# Patient Record
Sex: FEMALE | Race: WHITE | Employment: FULL TIME | URBAN - METROPOLITAN AREA
[De-identification: names, ages, dates, MRNs, and addresses within clinical notes are randomized per-mention and may not be internally consistent; named-entity substitution may affect disease eponyms.]

---

## 2023-11-26 ENCOUNTER — HOSPITAL ENCOUNTER (INPATIENT)
Age: 23
LOS: 1 days | Discharge: HOME | DRG: 720 | End: 2023-11-28
Attending: EMERGENCY MEDICINE | Admitting: INTERNAL MEDICINE
Payer: COMMERCIAL

## 2023-11-26 ENCOUNTER — APPOINTMENT (OUTPATIENT)
Dept: ULTRASOUND IMAGING | Age: 23
DRG: 720 | End: 2023-11-26
Payer: COMMERCIAL

## 2023-11-26 DIAGNOSIS — O00.201 RIGHT OVARIAN PREGNANCY WITHOUT INTRAUTERINE PREGNANCY: ICD-10-CM

## 2023-11-26 DIAGNOSIS — A41.9 SEPTICEMIA (HCC): Primary | ICD-10-CM

## 2023-11-26 DIAGNOSIS — E87.6 HYPOKALEMIA: ICD-10-CM

## 2023-11-26 DIAGNOSIS — N12 PYELONEPHRITIS: ICD-10-CM

## 2023-11-26 LAB
ABO + RH BLD: NORMAL
ALBUMIN SERPL-MCNC: 4.6 G/DL (ref 3.4–5)
ALBUMIN/GLOB SERPL: 1.5 {RATIO} (ref 1.1–2.2)
ALP SERPL-CCNC: 116 U/L (ref 40–129)
ALT SERPL-CCNC: 12 U/L (ref 10–40)
ANION GAP SERPL CALCULATED.3IONS-SCNC: 16 MMOL/L (ref 3–16)
AST SERPL-CCNC: 13 U/L (ref 15–37)
B-HCG SERPL EIA 3RD IS-ACNC: 138.3 MIU/ML
BACTERIA GENITAL QL WET PREP: NORMAL
BACTERIA URNS QL MICRO: ABNORMAL /HPF
BASOPHILS # BLD: 0 K/UL (ref 0–0.2)
BASOPHILS NFR BLD: 0.4 %
BILIRUB SERPL-MCNC: 0.6 MG/DL (ref 0–1)
BILIRUB UR QL STRIP.AUTO: NEGATIVE
BUN SERPL-MCNC: 7 MG/DL (ref 7–20)
CALCIUM SERPL-MCNC: 9.5 MG/DL (ref 8.3–10.6)
CHLORIDE SERPL-SCNC: 96 MMOL/L (ref 99–110)
CLARITY UR: ABNORMAL
CLUE CELLS SPEC QL WET PREP: NORMAL
CO2 SERPL-SCNC: 21 MMOL/L (ref 21–32)
COLOR UR: YELLOW
CREAT SERPL-MCNC: 0.6 MG/DL (ref 0.6–1.1)
DEPRECATED RDW RBC AUTO: 12.2 % (ref 12.4–15.4)
EOSINOPHIL # BLD: 0 K/UL (ref 0–0.6)
EOSINOPHIL NFR BLD: 0 %
EPI CELLS #/AREA URNS HPF: ABNORMAL /HPF (ref 0–5)
EPI CELLS SPEC QL WET PREP: NORMAL
GFR SERPLBLD CREATININE-BSD FMLA CKD-EPI: >60 ML/MIN/{1.73_M2}
GLUCOSE SERPL-MCNC: 89 MG/DL (ref 70–99)
GLUCOSE UR STRIP.AUTO-MCNC: NEGATIVE MG/DL
HCG UR QL: POSITIVE
HCT VFR BLD AUTO: 38.9 % (ref 36–48)
HGB BLD-MCNC: 13.2 G/DL (ref 12–16)
HGB UR QL STRIP.AUTO: ABNORMAL
KETONES UR STRIP.AUTO-MCNC: 40 MG/DL
LACTATE BLDV-SCNC: 0.9 MMOL/L (ref 0.4–1.9)
LEUKOCYTE ESTERASE UR QL STRIP.AUTO: ABNORMAL
LYMPHOCYTES # BLD: 1.5 K/UL (ref 1–5.1)
LYMPHOCYTES NFR BLD: 13.4 %
MAGNESIUM SERPL-MCNC: 1.8 MG/DL (ref 1.8–2.4)
MCH RBC QN AUTO: 30.6 PG (ref 26–34)
MCHC RBC AUTO-ENTMCNC: 34.1 G/DL (ref 31–36)
MCV RBC AUTO: 89.9 FL (ref 80–100)
MONOCYTES # BLD: 1.1 K/UL (ref 0–1.3)
MONOCYTES NFR BLD: 9.7 %
MUCOUS THREADS #/AREA URNS LPF: ABNORMAL /LPF
NEUTROPHILS # BLD: 8.4 K/UL (ref 1.7–7.7)
NEUTROPHILS NFR BLD: 76.5 %
NITRITE UR QL STRIP.AUTO: POSITIVE
PH UR STRIP.AUTO: 6 [PH] (ref 5–8)
PLATELET # BLD AUTO: 260 K/UL (ref 135–450)
PMV BLD AUTO: 7.4 FL (ref 5–10.5)
POTASSIUM SERPL-SCNC: 3.2 MMOL/L (ref 3.5–5.1)
PROT SERPL-MCNC: 7.7 G/DL (ref 6.4–8.2)
PROT UR STRIP.AUTO-MCNC: 100 MG/DL
RBC # BLD AUTO: 4.33 M/UL (ref 4–5.2)
RBC #/AREA URNS HPF: ABNORMAL /HPF (ref 0–4)
RBC SPEC QL WET PREP: NORMAL
SODIUM SERPL-SCNC: 133 MMOL/L (ref 136–145)
SP GR UR STRIP.AUTO: 1.02 (ref 1–1.03)
SPECIMEN SOURCE FLD: NORMAL
T VAGINALIS GENITAL QL WET PREP: NORMAL
UA COMPLETE W REFLEX CULTURE PNL UR: YES
UA DIPSTICK W REFLEX MICRO PNL UR: YES
URN SPEC COLLECT METH UR: ABNORMAL
UROBILINOGEN UR STRIP-ACNC: 0.2 E.U./DL
WBC # BLD AUTO: 11 K/UL (ref 4–11)
WBC #/AREA URNS HPF: >100 /HPF (ref 0–5)
WBC SPEC QL WET PREP: NORMAL
YEAST GENITAL QL WET PREP: NORMAL

## 2023-11-26 PROCEDURE — 87040 BLOOD CULTURE FOR BACTERIA: CPT

## 2023-11-26 PROCEDURE — 81001 URINALYSIS AUTO W/SCOPE: CPT

## 2023-11-26 PROCEDURE — 96361 HYDRATE IV INFUSION ADD-ON: CPT

## 2023-11-26 PROCEDURE — 99285 EMERGENCY DEPT VISIT HI MDM: CPT

## 2023-11-26 PROCEDURE — 86901 BLOOD TYPING SEROLOGIC RH(D): CPT

## 2023-11-26 PROCEDURE — 80053 COMPREHEN METABOLIC PANEL: CPT

## 2023-11-26 PROCEDURE — 99222 1ST HOSP IP/OBS MODERATE 55: CPT | Performed by: OBSTETRICS & GYNECOLOGY

## 2023-11-26 PROCEDURE — 86900 BLOOD TYPING SEROLOGIC ABO: CPT

## 2023-11-26 PROCEDURE — 87086 URINE CULTURE/COLONY COUNT: CPT

## 2023-11-26 PROCEDURE — 87591 N.GONORRHOEAE DNA AMP PROB: CPT

## 2023-11-26 PROCEDURE — 85025 COMPLETE CBC W/AUTO DIFF WBC: CPT

## 2023-11-26 PROCEDURE — 84145 PROCALCITONIN (PCT): CPT

## 2023-11-26 PROCEDURE — 87077 CULTURE AEROBIC IDENTIFY: CPT

## 2023-11-26 PROCEDURE — 6360000002 HC RX W HCPCS: Performed by: PHYSICIAN ASSISTANT

## 2023-11-26 PROCEDURE — 76801 OB US < 14 WKS SINGLE FETUS: CPT

## 2023-11-26 PROCEDURE — 83605 ASSAY OF LACTIC ACID: CPT

## 2023-11-26 PROCEDURE — 84702 CHORIONIC GONADOTROPIN TEST: CPT

## 2023-11-26 PROCEDURE — 87210 SMEAR WET MOUNT SALINE/INK: CPT

## 2023-11-26 PROCEDURE — 76817 TRANSVAGINAL US OBSTETRIC: CPT

## 2023-11-26 PROCEDURE — 96365 THER/PROPH/DIAG IV INF INIT: CPT

## 2023-11-26 PROCEDURE — 84703 CHORIONIC GONADOTROPIN ASSAY: CPT

## 2023-11-26 PROCEDURE — 83735 ASSAY OF MAGNESIUM: CPT

## 2023-11-26 PROCEDURE — 87491 CHLMYD TRACH DNA AMP PROBE: CPT

## 2023-11-26 PROCEDURE — 87186 SC STD MICRODIL/AGAR DIL: CPT

## 2023-11-26 PROCEDURE — 6370000000 HC RX 637 (ALT 250 FOR IP): Performed by: PHYSICIAN ASSISTANT

## 2023-11-26 PROCEDURE — 2580000003 HC RX 258: Performed by: PHYSICIAN ASSISTANT

## 2023-11-26 RX ORDER — POTASSIUM CHLORIDE 20 MEQ/1
40 TABLET, EXTENDED RELEASE ORAL ONCE
Status: COMPLETED | OUTPATIENT
Start: 2023-11-26 | End: 2023-11-26

## 2023-11-26 RX ORDER — 0.9 % SODIUM CHLORIDE 0.9 %
30 INTRAVENOUS SOLUTION INTRAVENOUS ONCE
Status: COMPLETED | OUTPATIENT
Start: 2023-11-26 | End: 2023-11-26

## 2023-11-26 RX ADMIN — CEFTRIAXONE SODIUM 1000 MG: 1 INJECTION, POWDER, FOR SOLUTION INTRAMUSCULAR; INTRAVENOUS at 19:30

## 2023-11-26 RX ADMIN — POTASSIUM CHLORIDE 40 MEQ: 1500 TABLET, EXTENDED RELEASE ORAL at 21:34

## 2023-11-26 RX ADMIN — SODIUM CHLORIDE 1632 ML: 9 INJECTION, SOLUTION INTRAVENOUS at 19:19

## 2023-11-26 ASSESSMENT — PAIN DESCRIPTION - DESCRIPTORS: DESCRIPTORS: ACHING

## 2023-11-26 ASSESSMENT — PAIN DESCRIPTION - PAIN TYPE: TYPE: ACUTE PAIN

## 2023-11-26 ASSESSMENT — PAIN SCALES - GENERAL: PAINLEVEL_OUTOF10: 7

## 2023-11-26 ASSESSMENT — PAIN DESCRIPTION - LOCATION: LOCATION: BACK

## 2023-11-26 ASSESSMENT — PAIN DESCRIPTION - ORIENTATION: ORIENTATION: LOWER

## 2023-11-26 ASSESSMENT — PAIN - FUNCTIONAL ASSESSMENT: PAIN_FUNCTIONAL_ASSESSMENT: 0-10

## 2023-11-26 ASSESSMENT — PAIN DESCRIPTION - FREQUENCY: FREQUENCY: CONTINUOUS

## 2023-11-27 ENCOUNTER — APPOINTMENT (OUTPATIENT)
Dept: ULTRASOUND IMAGING | Age: 23
DRG: 720 | End: 2023-11-27
Payer: COMMERCIAL

## 2023-11-27 PROBLEM — O36.80X0 PREGNANCY OF UNKNOWN ANATOMIC LOCATION: Status: ACTIVE | Noted: 2023-11-27

## 2023-11-27 PROBLEM — A41.9 SEPSIS (HCC): Status: ACTIVE | Noted: 2023-11-27

## 2023-11-27 LAB
ALBUMIN SERPL-MCNC: 2.8 G/DL (ref 3.4–5)
ALBUMIN/GLOB SERPL: 1.3 {RATIO} (ref 1.1–2.2)
ALP SERPL-CCNC: 78 U/L (ref 40–129)
ALT SERPL-CCNC: <5 U/L (ref 10–40)
ANION GAP SERPL CALCULATED.3IONS-SCNC: 12 MMOL/L (ref 3–16)
ANION GAP SERPL CALCULATED.3IONS-SCNC: 15 MMOL/L (ref 3–16)
AST SERPL-CCNC: 8 U/L (ref 15–37)
B-HCG SERPL EIA 3RD IS-ACNC: 107.5 MIU/ML
BILIRUB SERPL-MCNC: 0.4 MG/DL (ref 0–1)
BUN SERPL-MCNC: 5 MG/DL (ref 7–20)
BUN SERPL-MCNC: 6 MG/DL (ref 7–20)
CALCIUM SERPL-MCNC: 6.9 MG/DL (ref 8.3–10.6)
CALCIUM SERPL-MCNC: 8.5 MG/DL (ref 8.3–10.6)
CHLORIDE SERPL-SCNC: 107 MMOL/L (ref 99–110)
CHLORIDE SERPL-SCNC: 111 MMOL/L (ref 99–110)
CO2 SERPL-SCNC: 12 MMOL/L (ref 21–32)
CO2 SERPL-SCNC: 18 MMOL/L (ref 21–32)
CREAT SERPL-MCNC: <0.5 MG/DL (ref 0.6–1.1)
CREAT SERPL-MCNC: <0.5 MG/DL (ref 0.6–1.1)
DEPRECATED RDW RBC AUTO: 12.6 % (ref 12.4–15.4)
GFR SERPLBLD CREATININE-BSD FMLA CKD-EPI: >60 ML/MIN/{1.73_M2}
GFR SERPLBLD CREATININE-BSD FMLA CKD-EPI: >60 ML/MIN/{1.73_M2}
GLUCOSE BLD-MCNC: 67 MG/DL (ref 70–99)
GLUCOSE SERPL-MCNC: 43 MG/DL (ref 70–99)
GLUCOSE SERPL-MCNC: 83 MG/DL (ref 70–99)
HCT VFR BLD AUTO: 32.8 % (ref 36–48)
HGB BLD-MCNC: 10.9 G/DL (ref 12–16)
HGB BLD-MCNC: 11 G/DL (ref 12–16)
HGB BLD-MCNC: 11.3 G/DL (ref 12–16)
LACTATE BLDV-SCNC: 0.4 MMOL/L (ref 0.4–1.9)
LACTATE BLDV-SCNC: 0.5 MMOL/L (ref 0.4–1.9)
MAGNESIUM SERPL-MCNC: 1.4 MG/DL (ref 1.8–2.4)
MCH RBC QN AUTO: 30.7 PG (ref 26–34)
MCHC RBC AUTO-ENTMCNC: 33.4 G/DL (ref 31–36)
MCV RBC AUTO: 91.9 FL (ref 80–100)
PERFORMED ON: ABNORMAL
PLATELET # BLD AUTO: 196 K/UL (ref 135–450)
PMV BLD AUTO: 7.4 FL (ref 5–10.5)
POTASSIUM SERPL-SCNC: 3.2 MMOL/L (ref 3.5–5.1)
POTASSIUM SERPL-SCNC: 3.9 MMOL/L (ref 3.5–5.1)
PROCALCITONIN SERPL IA-MCNC: 0.1 NG/ML (ref 0–0.15)
PROCALCITONIN SERPL IA-MCNC: 0.11 NG/ML (ref 0–0.15)
PROT SERPL-MCNC: 4.9 G/DL (ref 6.4–8.2)
RBC # BLD AUTO: 3.56 M/UL (ref 4–5.2)
SODIUM SERPL-SCNC: 137 MMOL/L (ref 136–145)
SODIUM SERPL-SCNC: 138 MMOL/L (ref 136–145)
WBC # BLD AUTO: 11.6 K/UL (ref 4–11)

## 2023-11-27 PROCEDURE — 1200000000 HC SEMI PRIVATE

## 2023-11-27 PROCEDURE — 80053 COMPREHEN METABOLIC PANEL: CPT

## 2023-11-27 PROCEDURE — 99231 SBSQ HOSP IP/OBS SF/LOW 25: CPT | Performed by: OBSTETRICS & GYNECOLOGY

## 2023-11-27 PROCEDURE — 85018 HEMOGLOBIN: CPT

## 2023-11-27 PROCEDURE — 6360000002 HC RX W HCPCS: Performed by: INTERNAL MEDICINE

## 2023-11-27 PROCEDURE — 36415 COLL VENOUS BLD VENIPUNCTURE: CPT

## 2023-11-27 PROCEDURE — 6370000000 HC RX 637 (ALT 250 FOR IP): Performed by: INTERNAL MEDICINE

## 2023-11-27 PROCEDURE — 84145 PROCALCITONIN (PCT): CPT

## 2023-11-27 PROCEDURE — 84702 CHORIONIC GONADOTROPIN TEST: CPT

## 2023-11-27 PROCEDURE — 2580000003 HC RX 258: Performed by: NURSE PRACTITIONER

## 2023-11-27 PROCEDURE — 6370000000 HC RX 637 (ALT 250 FOR IP): Performed by: NURSE PRACTITIONER

## 2023-11-27 PROCEDURE — 83605 ASSAY OF LACTIC ACID: CPT

## 2023-11-27 PROCEDURE — 83735 ASSAY OF MAGNESIUM: CPT

## 2023-11-27 PROCEDURE — 76817 TRANSVAGINAL US OBSTETRIC: CPT

## 2023-11-27 PROCEDURE — 6360000002 HC RX W HCPCS: Performed by: NURSE PRACTITIONER

## 2023-11-27 PROCEDURE — 2580000003 HC RX 258: Performed by: STUDENT IN AN ORGANIZED HEALTH CARE EDUCATION/TRAINING PROGRAM

## 2023-11-27 PROCEDURE — 85027 COMPLETE CBC AUTOMATED: CPT

## 2023-11-27 RX ORDER — SODIUM CHLORIDE 0.9 % (FLUSH) 0.9 %
5-40 SYRINGE (ML) INJECTION EVERY 12 HOURS SCHEDULED
Status: DISCONTINUED | OUTPATIENT
Start: 2023-11-27 | End: 2023-11-28 | Stop reason: HOSPADM

## 2023-11-27 RX ORDER — MAGNESIUM SULFATE IN WATER 40 MG/ML
4000 INJECTION, SOLUTION INTRAVENOUS ONCE
Status: COMPLETED | OUTPATIENT
Start: 2023-11-27 | End: 2023-11-27

## 2023-11-27 RX ORDER — POTASSIUM CHLORIDE 20 MEQ/1
40 TABLET, EXTENDED RELEASE ORAL ONCE
Status: COMPLETED | OUTPATIENT
Start: 2023-11-27 | End: 2023-11-27

## 2023-11-27 RX ORDER — PRENATAL WITH FERROUS FUM AND FOLIC ACID 3080; 920; 120; 400; 22; 1.84; 3; 20; 10; 1; 12; 200; 27; 25; 2 [IU]/1; [IU]/1; MG/1; [IU]/1; MG/1; MG/1; MG/1; MG/1; MG/1; MG/1; UG/1; MG/1; MG/1; MG/1; MG/1
1 TABLET ORAL DAILY
Status: DISCONTINUED | OUTPATIENT
Start: 2023-11-27 | End: 2023-11-28 | Stop reason: HOSPADM

## 2023-11-27 RX ORDER — SODIUM CHLORIDE 9 MG/ML
INJECTION, SOLUTION INTRAVENOUS CONTINUOUS
Status: DISCONTINUED | OUTPATIENT
Start: 2023-11-27 | End: 2023-11-28 | Stop reason: HOSPADM

## 2023-11-27 RX ORDER — SODIUM CHLORIDE 0.9 % (FLUSH) 0.9 %
5-40 SYRINGE (ML) INJECTION PRN
Status: DISCONTINUED | OUTPATIENT
Start: 2023-11-27 | End: 2023-11-28 | Stop reason: HOSPADM

## 2023-11-27 RX ORDER — SODIUM CHLORIDE 9 MG/ML
INJECTION, SOLUTION INTRAVENOUS PRN
Status: DISCONTINUED | OUTPATIENT
Start: 2023-11-27 | End: 2023-11-28 | Stop reason: HOSPADM

## 2023-11-27 RX ORDER — 0.9 % SODIUM CHLORIDE 0.9 %
1000 INTRAVENOUS SOLUTION INTRAVENOUS ONCE
Status: COMPLETED | OUTPATIENT
Start: 2023-11-27 | End: 2023-11-27

## 2023-11-27 RX ORDER — PANTOPRAZOLE SODIUM 40 MG/1
40 TABLET, DELAYED RELEASE ORAL
Status: DISCONTINUED | OUTPATIENT
Start: 2023-11-27 | End: 2023-11-28 | Stop reason: HOSPADM

## 2023-11-27 RX ORDER — ACETAMINOPHEN 325 MG/1
650 TABLET ORAL EVERY 6 HOURS PRN
Status: DISCONTINUED | OUTPATIENT
Start: 2023-11-27 | End: 2023-11-28 | Stop reason: HOSPADM

## 2023-11-27 RX ORDER — ACETAMINOPHEN 650 MG/1
650 SUPPOSITORY RECTAL EVERY 6 HOURS PRN
Status: DISCONTINUED | OUTPATIENT
Start: 2023-11-27 | End: 2023-11-28 | Stop reason: HOSPADM

## 2023-11-27 RX ADMIN — Medication 10 ML: at 05:52

## 2023-11-27 RX ADMIN — MAGNESIUM SULFATE HEPTAHYDRATE 4000 MG: 40 INJECTION, SOLUTION INTRAVENOUS at 10:29

## 2023-11-27 RX ADMIN — SODIUM CHLORIDE: 9 INJECTION, SOLUTION INTRAVENOUS at 02:04

## 2023-11-27 RX ADMIN — CEFTRIAXONE SODIUM 1000 MG: 1 INJECTION, POWDER, FOR SOLUTION INTRAMUSCULAR; INTRAVENOUS at 20:14

## 2023-11-27 RX ADMIN — SODIUM CHLORIDE: 9 INJECTION, SOLUTION INTRAVENOUS at 12:49

## 2023-11-27 RX ADMIN — Medication 10 ML: at 20:14

## 2023-11-27 RX ADMIN — SODIUM CHLORIDE 1000 ML: 9 INJECTION, SOLUTION INTRAVENOUS at 05:52

## 2023-11-27 RX ADMIN — PANTOPRAZOLE SODIUM 40 MG: 40 TABLET, DELAYED RELEASE ORAL at 05:52

## 2023-11-27 RX ADMIN — SODIUM CHLORIDE: 9 INJECTION, SOLUTION INTRAVENOUS at 21:22

## 2023-11-27 RX ADMIN — POTASSIUM CHLORIDE 40 MEQ: 1500 TABLET, EXTENDED RELEASE ORAL at 10:25

## 2023-11-27 ASSESSMENT — PAIN DESCRIPTION - DESCRIPTORS: DESCRIPTORS: ACHING

## 2023-11-27 ASSESSMENT — PAIN SCALES - GENERAL
PAINLEVEL_OUTOF10: 0
PAINLEVEL_OUTOF10: 4
PAINLEVEL_OUTOF10: 4

## 2023-11-27 ASSESSMENT — PAIN DESCRIPTION - ORIENTATION: ORIENTATION: LEFT;LOWER

## 2023-11-27 ASSESSMENT — PAIN DESCRIPTION - PAIN TYPE: TYPE: ACUTE PAIN

## 2023-11-27 ASSESSMENT — PAIN DESCRIPTION - LOCATION: LOCATION: ABDOMEN;FLANK

## 2023-11-27 NOTE — CONSULTS
[id number] [id number] [facility]  Per Zainab HASTINGS  Re  possible ectopic pregnancy, sepsis-- uti/pyelonephritis  2154- Dr. Brando Verma returned page
for expectant management at this time   - recommend serial H/H q6hr overnight to ensure stability   - serial abdominal exams  - repeat HCG in AM and consider repeat US pending results    2.  Pyelonephritis  - Left CVA tenderness and UA with +nitrites  - meets sepsis criteria, management per primary team    Dispo: admit as above    Rachel Mitchell MD  [facility]

## 2023-11-27 NOTE — CARE COORDINATION
Chart reviewed and met with patient at bedside. IPTA. Has payor source, but no PCP (just  moved to this area). Provided with [facility] brochure. Denies further needs. Please notify CM if discharge needs arise. Cata Gao RN      .

## 2023-11-27 NOTE — H&P
normal.  No rashes or lesions. Capillary Refill:  Brisk,< 3 seconds   Peripheral Pulses:  +2 palpable, equal bilaterally     Diet: [x]Adult/General  []Cardiac  []Diabetic  []Low Fat  []NPO  []NPO after Midnight  []Other   DVT Prophylaxis: []PPx LMWH  []SQ Heparin  [x]IPC/SCDs  []Eliquis  []Xarelto  []Coumadin     Code Status: [x]Full  []DNR/CCA  []Limited (DNR/CCA with Do Not Intubate)  []DNRCC  PT/OT Evaluation Status:   []NOT yet ordered  []Ordered and Pending   []Seen with Recommendations for:  []Home independently  []Home w/ assist  []HHC  []SNF  []Acute Rehab    Anticipated Discharge Day/Date:  11/29/2023    Anticipated Discharge Location: [x]Home  []HHC  []SNF  []Acute Rehab  []ECF  []LTAC  []Hospice    Consults:      IP CONSULT TO OB GYN  IP CONSULT TO HOSPITALIST      This patient has a high likelihood of being discharged tomorrow and is appropriate for A1 Discharge Unit in AM pending clinical course overnight: []Yes  [x]No    --------------------------------------------------------------------------------------------------------------------------------------------------------------------    Imaging:     US OB LESS THAN 14 WEEKS SINGLE OR FIRST GESTATION    Result Date: 11/26/2023  EXAMINATION: FIRST TRIMESTER OBSTETRIC ULTRASOUND 11/26/2023 TECHNIQUE: Transvaginal first trimester obstetric pelvic duplex ultrasound was performed with real-time imaging, color flow Doppler imaging, and spectral analysis. COMPARISON: None HISTORY: ORDERING SYSTEM PROVIDED HISTORY: left sided pelvic pain in pregnancy, per patient, left flank pain and RLQ pain x 2 days, spotting Unknown LMP, no serum beta HCG level provided FINDINGS: Uterus: 7.6 x 4.7 x 3.9 cm, normal endometrial stripe 12 mm. No intrauterine pregnancy. Trace endometrial canal fluid. Right ovary: 3.9 x 2.7 x 2.1 cm. Thick walled 2.4 cm ovoid focus right adnexa has circumferential vascular flow (\"ring of fire\") on duplex interrogation.  Otherwise normal

## 2023-11-28 VITALS
WEIGHT: 112.3 LBS | BODY MASS INDEX: 20.67 KG/M2 | HEART RATE: 97 BPM | RESPIRATION RATE: 18 BRPM | TEMPERATURE: 98.6 F | HEIGHT: 62 IN | SYSTOLIC BLOOD PRESSURE: 102 MMHG | DIASTOLIC BLOOD PRESSURE: 63 MMHG | OXYGEN SATURATION: 98 %

## 2023-11-28 LAB
ALBUMIN SERPL-MCNC: 3.4 G/DL (ref 3.4–5)
ALBUMIN/GLOB SERPL: 1.3 {RATIO} (ref 1.1–2.2)
ALP SERPL-CCNC: 97 U/L (ref 40–129)
ALT SERPL-CCNC: 9 U/L (ref 10–40)
ANION GAP SERPL CALCULATED.3IONS-SCNC: 11 MMOL/L (ref 3–16)
AST SERPL-CCNC: 9 U/L (ref 15–37)
B-HCG SERPL EIA 3RD IS-ACNC: 237.5 MIU/ML
BACTERIA UR CULT: ABNORMAL
BASOPHILS # BLD: 0 K/UL (ref 0–0.2)
BASOPHILS NFR BLD: 0.2 %
BILIRUB SERPL-MCNC: <0.2 MG/DL (ref 0–1)
BUN SERPL-MCNC: 6 MG/DL (ref 7–20)
C TRACH DNA CVX QL NAA+PROBE: NEGATIVE
CALCIUM SERPL-MCNC: 8.7 MG/DL (ref 8.3–10.6)
CHLORIDE SERPL-SCNC: 104 MMOL/L (ref 99–110)
CO2 SERPL-SCNC: 19 MMOL/L (ref 21–32)
CREAT SERPL-MCNC: <0.5 MG/DL (ref 0.6–1.1)
DEPRECATED RDW RBC AUTO: 12.1 % (ref 12.4–15.4)
EOSINOPHIL # BLD: 0.2 K/UL (ref 0–0.6)
EOSINOPHIL NFR BLD: 1.8 %
GFR SERPLBLD CREATININE-BSD FMLA CKD-EPI: >60 ML/MIN/{1.73_M2}
GLUCOSE SERPL-MCNC: 83 MG/DL (ref 70–99)
HCT VFR BLD AUTO: 34 % (ref 36–48)
HGB BLD-MCNC: 11.6 G/DL (ref 12–16)
LYMPHOCYTES # BLD: 1.9 K/UL (ref 1–5.1)
LYMPHOCYTES NFR BLD: 17.2 %
MCH RBC QN AUTO: 31.1 PG (ref 26–34)
MCHC RBC AUTO-ENTMCNC: 34.3 G/DL (ref 31–36)
MCV RBC AUTO: 90.8 FL (ref 80–100)
MONOCYTES # BLD: 0.9 K/UL (ref 0–1.3)
MONOCYTES NFR BLD: 8.3 %
N GONORRHOEA DNA CERV MUCUS QL NAA+PROBE: NEGATIVE
NEUTROPHILS # BLD: 7.8 K/UL (ref 1.7–7.7)
NEUTROPHILS NFR BLD: 72.5 %
ORGANISM: ABNORMAL
PLATELET # BLD AUTO: 213 K/UL (ref 135–450)
PMV BLD AUTO: 7.9 FL (ref 5–10.5)
POTASSIUM SERPL-SCNC: 3.9 MMOL/L (ref 3.5–5.1)
PROT SERPL-MCNC: 6.1 G/DL (ref 6.4–8.2)
RBC # BLD AUTO: 3.74 M/UL (ref 4–5.2)
SODIUM SERPL-SCNC: 134 MMOL/L (ref 136–145)
WBC # BLD AUTO: 10.8 K/UL (ref 4–11)

## 2023-11-28 PROCEDURE — 85025 COMPLETE CBC W/AUTO DIFF WBC: CPT

## 2023-11-28 PROCEDURE — 84702 CHORIONIC GONADOTROPIN TEST: CPT

## 2023-11-28 PROCEDURE — 2580000003 HC RX 258: Performed by: NURSE PRACTITIONER

## 2023-11-28 PROCEDURE — 80053 COMPREHEN METABOLIC PANEL: CPT

## 2023-11-28 PROCEDURE — 99232 SBSQ HOSP IP/OBS MODERATE 35: CPT | Performed by: OBSTETRICS & GYNECOLOGY

## 2023-11-28 PROCEDURE — 6370000000 HC RX 637 (ALT 250 FOR IP): Performed by: NURSE PRACTITIONER

## 2023-11-28 RX ORDER — CEFUROXIME AXETIL 500 MG/1
500 TABLET ORAL 2 TIMES DAILY
Qty: 14 TABLET | Refills: 0 | Status: SHIPPED | OUTPATIENT
Start: 2023-11-28 | End: 2023-12-05

## 2023-11-28 RX ADMIN — SODIUM CHLORIDE: 9 INJECTION, SOLUTION INTRAVENOUS at 05:31

## 2023-11-28 RX ADMIN — PANTOPRAZOLE SODIUM 40 MG: 40 TABLET, DELAYED RELEASE ORAL at 05:37

## 2023-11-28 ASSESSMENT — PAIN SCALES - GENERAL: PAINLEVEL_OUTOF10: 0

## 2023-11-28 NOTE — PLAN OF CARE
Problem: Pain  Goal: Verbalizes/displays adequate comfort level or baseline comfort level  Outcome: Progressing   Patient free of pain and satisfied at this time. PRN medications available if needed.

## 2023-11-28 NOTE — PLAN OF CARE
Problem: Discharge Planning  Goal: Discharge to home or other facility with appropriate resources  Outcome: Adequate for Discharge     Problem: Pain  Goal: Verbalizes/displays adequate comfort level or baseline comfort level  11/28/2023 1149 by Agustin Olivares RN  Outcome: Adequate for Discharge  11/28/2023 1008 by Agustin Olivares RN  Outcome: Progressing     Problem: Safety - Adult  Goal: Free from fall injury  Outcome: Adequate for Discharge

## 2023-11-30 ENCOUNTER — NURSE ONLY (OUTPATIENT)
Dept: OBGYN CLINIC | Age: 23
End: 2023-11-30
Payer: COMMERCIAL

## 2023-11-30 DIAGNOSIS — O36.80X0 PREGNANCY OF UNKNOWN ANATOMIC LOCATION: Primary | ICD-10-CM

## 2023-11-30 LAB
BACTERIA BLD CULT ORG #2: NORMAL
BACTERIA BLD CULT: NORMAL

## 2023-11-30 PROCEDURE — 36415 COLL VENOUS BLD VENIPUNCTURE: CPT | Performed by: OBSTETRICS & GYNECOLOGY

## 2023-11-30 NOTE — PROGRESS NOTES
Blood draw from R Antecubital x 1 attempt without difficulty. 1 SST,  Patient tolerated well.  Anatoliy Pearl

## 2023-12-01 ENCOUNTER — OFFICE VISIT (OUTPATIENT)
Dept: OBGYN CLINIC | Age: 23
End: 2023-12-01

## 2023-12-01 VITALS
WEIGHT: 113 LBS | OXYGEN SATURATION: 98 % | SYSTOLIC BLOOD PRESSURE: 105 MMHG | BODY MASS INDEX: 20.67 KG/M2 | HEART RATE: 68 BPM | TEMPERATURE: 98.3 F | DIASTOLIC BLOOD PRESSURE: 70 MMHG

## 2023-12-01 DIAGNOSIS — O23.01 PYELONEPHRITIS AFFECTING PREGNANCY IN FIRST TRIMESTER: ICD-10-CM

## 2023-12-01 DIAGNOSIS — O36.80X0 PREGNANCY OF UNKNOWN ANATOMIC LOCATION: Primary | ICD-10-CM

## 2023-12-01 LAB — B-HCG SERPL EIA 3RD IS-ACNC: 737.2 MIU/ML

## 2023-12-01 RX ORDER — CEPHALEXIN 500 MG/1
500 CAPSULE ORAL NIGHTLY
Qty: 30 CAPSULE | Refills: 4 | Status: SHIPPED | OUTPATIENT
Start: 2023-12-01

## 2023-12-01 NOTE — PROGRESS NOTES
interim. All questions answered at this time. 2. Pyelonephritis affecting pregnancy in first trimester  Treated for pyelo and sepsis 11/26/23 --> still taking abx 2x/day.  Reviewed use of prophylaxis through pregnancy to prevent recurrent, rx nightly keflex sent today    Dispo: PRN or 1 week for f/u with Maryana Parry MD

## 2023-12-07 ENCOUNTER — OFFICE VISIT (OUTPATIENT)
Dept: OBGYN CLINIC | Age: 23
End: 2023-12-07
Payer: COMMERCIAL

## 2023-12-07 VITALS
BODY MASS INDEX: 20.85 KG/M2 | SYSTOLIC BLOOD PRESSURE: 110 MMHG | HEART RATE: 94 BPM | TEMPERATURE: 98 F | WEIGHT: 114 LBS | DIASTOLIC BLOOD PRESSURE: 68 MMHG | OXYGEN SATURATION: 98 %

## 2023-12-07 DIAGNOSIS — O36.80X0 PREGNANCY WITH UNCERTAIN FETAL VIABILITY, SINGLE OR UNSPECIFIED FETUS: Primary | ICD-10-CM

## 2023-12-07 DIAGNOSIS — O23.01 PYELONEPHRITIS AFFECTING PREGNANCY IN FIRST TRIMESTER: ICD-10-CM

## 2023-12-07 PROCEDURE — 99213 OFFICE O/P EST LOW 20 MIN: CPT | Performed by: OBSTETRICS & GYNECOLOGY

## 2023-12-07 RX ORDER — CEPHALEXIN 500 MG/1
500 CAPSULE ORAL NIGHTLY
Qty: 30 CAPSULE | Refills: 4 | Status: SHIPPED | OUTPATIENT
Start: 2023-12-07

## 2023-12-08 LAB
BACTERIA URNS QL MICRO: ABNORMAL /HPF
BILIRUB UR QL STRIP.AUTO: NEGATIVE
CLARITY UR: ABNORMAL
COLOR UR: YELLOW
EPI CELLS #/AREA URNS AUTO: 35 /HPF (ref 0–5)
GLUCOSE UR STRIP.AUTO-MCNC: NEGATIVE MG/DL
HGB UR QL STRIP.AUTO: NEGATIVE
HYALINE CASTS #/AREA URNS AUTO: 1 /LPF (ref 0–8)
KETONES UR STRIP.AUTO-MCNC: NEGATIVE MG/DL
LEUKOCYTE ESTERASE UR QL STRIP.AUTO: NEGATIVE
MUCUS: PRESENT
NITRITE UR QL STRIP.AUTO: NEGATIVE
PH UR STRIP.AUTO: 5.5 [PH] (ref 5–8)
PROT UR STRIP.AUTO-MCNC: ABNORMAL MG/DL
RBC CLUMPS #/AREA URNS AUTO: 1 /HPF (ref 0–4)
SP GR UR STRIP.AUTO: 1.03 (ref 1–1.03)
UA DIPSTICK W REFLEX MICRO PNL UR: YES
URN SPEC COLLECT METH UR: ABNORMAL
UROBILINOGEN UR STRIP-ACNC: 0.2 E.U./DL
WBC #/AREA URNS AUTO: 2 /HPF (ref 0–5)

## 2023-12-10 LAB
BACTERIA UR CULT: ABNORMAL
ORGANISM: ABNORMAL

## 2023-12-21 PROBLEM — O21.9 NAUSEA AND VOMITING IN PREGNANCY: Status: ACTIVE | Noted: 2023-12-21

## 2023-12-21 PROBLEM — Z34.91 PRENATAL CARE IN FIRST TRIMESTER: Status: ACTIVE | Noted: 2023-12-21

## 2024-01-19 ENCOUNTER — ROUTINE PRENATAL (OUTPATIENT)
Dept: OBGYN CLINIC | Age: 24
End: 2024-01-19

## 2024-01-19 VITALS
OXYGEN SATURATION: 99 % | TEMPERATURE: 98.3 F | HEIGHT: 62 IN | WEIGHT: 110.2 LBS | BODY MASS INDEX: 20.28 KG/M2 | HEART RATE: 115 BPM | DIASTOLIC BLOOD PRESSURE: 70 MMHG | SYSTOLIC BLOOD PRESSURE: 110 MMHG

## 2024-01-19 DIAGNOSIS — Z34.91 PRENATAL CARE IN FIRST TRIMESTER: Primary | ICD-10-CM

## 2024-01-19 DIAGNOSIS — O23.01 PYELONEPHRITIS AFFECTING PREGNANCY IN FIRST TRIMESTER: ICD-10-CM

## 2024-01-19 DIAGNOSIS — O21.9 NAUSEA AND VOMITING IN PREGNANCY: ICD-10-CM

## 2024-01-19 RX ORDER — ONDANSETRON 4 MG/1
4 TABLET, ORALLY DISINTEGRATING ORAL EVERY 8 HOURS PRN
Qty: 20 TABLET | Refills: 1 | Status: SHIPPED | OUTPATIENT
Start: 2024-01-19

## 2024-01-19 SDOH — ECONOMIC STABILITY: INCOME INSECURITY: HOW HARD IS IT FOR YOU TO PAY FOR THE VERY BASICS LIKE FOOD, HOUSING, MEDICAL CARE, AND HEATING?: NOT VERY HARD

## 2024-01-19 SDOH — ECONOMIC STABILITY: FOOD INSECURITY: WITHIN THE PAST 12 MONTHS, THE FOOD YOU BOUGHT JUST DIDN'T LAST AND YOU DIDN'T HAVE MONEY TO GET MORE.: NEVER TRUE

## 2024-01-19 SDOH — ECONOMIC STABILITY: TRANSPORTATION INSECURITY
IN THE PAST 12 MONTHS, HAS LACK OF TRANSPORTATION KEPT YOU FROM MEETINGS, WORK, OR FROM GETTING THINGS NEEDED FOR DAILY LIVING?: NO

## 2024-01-19 SDOH — ECONOMIC STABILITY: HOUSING INSECURITY
IN THE LAST 12 MONTHS, WAS THERE A TIME WHEN YOU DID NOT HAVE A STEADY PLACE TO SLEEP OR SLEPT IN A SHELTER (INCLUDING NOW)?: NO

## 2024-01-19 SDOH — ECONOMIC STABILITY: FOOD INSECURITY: WITHIN THE PAST 12 MONTHS, YOU WORRIED THAT YOUR FOOD WOULD RUN OUT BEFORE YOU GOT MONEY TO BUY MORE.: NEVER TRUE

## 2024-01-19 NOTE — ASSESSMENT & PLAN NOTE
- FWB: reassuring by vishal today  - Genetic screening: declined  - Anatomy scan: plan at 20 weeks  - Flu shot: declined  - Tdap: 28 weeks  - PNL: O+/ab-, RI, HepB/C neg, RPRnr, HIV neg, VZV non-immune, Hgb 13.0, UDS neg, GCCT neg, Ucx + (see below)

## 2024-01-19 NOTE — ASSESSMENT & PLAN NOTE
Treated for pyelo and sepsis 11/26/23 --> then retreated for UTI early December  - send RAJANI today  - continue keflex suppression throughout pregnancy

## 2024-01-19 NOTE — PROGRESS NOTES
Return OB Office Visit    CC:   Chief Complaint   Patient presents with    Routine Prenatal Visit       HPI:  Pt seen and examined. No concerns/complaints. Denies VB, LOF, cramps. No FM yet. Had some nausea for a few days, better now. No urinary issues, no blood in urine.     Maternal wellness questionnaire reviewed - no concerns today.     Objective:  /70 (Site: Right Upper Arm, Position: Sitting, Cuff Size: Medium Adult)   Pulse (!) 115   Temp 98.3 °F (36.8 °C) (Temporal)   Ht 1.575 m (5' 2\")   Wt 50 kg (110 lb 3.2 oz)   LMP  (LMP Unknown) Comment: Haven't had a period since having her 8 month old.  SpO2 99%   BMI 20.16 kg/m²   Gen: AO, NAD  Abd: Soft, NT  FHT: 167  FH: below U    Assessment/Plan:  23 y.o.  at 11w3d (Estimated Date of Delivery: 24) presents for MARCUS appointment:     Problem List Items Addressed This Visit          Digestive    Nausea and vomiting in pregnancy     Rx for meds at this time, advised to call if symptoms worsen for rx  - TWG: -3lbs             Genitourinary    Pyelonephritis affecting pregnancy in first trimester     Treated for pyelo and sepsis 23 --> then retreated for UTI early December  - send RAJANI today  - continue keflex suppression throughout pregnancy          Relevant Orders    Culture, Urine       Other    Prenatal care in first trimester - Primary     - FWB: reassuring by doptones today  - Genetic screening: declined  - Anatomy scan: plan at 20 weeks  - Flu shot: declined  - Tdap: 28 weeks  - PNL: O+/ab-, RI, HepB/C neg, RPRnr, HIV neg, VZV non-immune, Hgb 13.0, UDS neg, GCCT neg, Ucx + (see below)             Dispo: RTC in 4 weeks  Emily Guzman MD

## 2024-01-21 LAB
BACTERIA UR CULT: ABNORMAL
ORGANISM: ABNORMAL

## 2024-01-22 LAB
BACTERIA UR CULT: ABNORMAL
ORGANISM: ABNORMAL

## 2024-02-12 ENCOUNTER — ROUTINE PRENATAL (OUTPATIENT)
Dept: OBGYN CLINIC | Age: 24
End: 2024-02-12
Payer: COMMERCIAL

## 2024-02-12 VITALS
TEMPERATURE: 98.2 F | SYSTOLIC BLOOD PRESSURE: 116 MMHG | WEIGHT: 112.4 LBS | DIASTOLIC BLOOD PRESSURE: 78 MMHG | OXYGEN SATURATION: 99 % | HEIGHT: 62 IN | HEART RATE: 106 BPM | BODY MASS INDEX: 20.68 KG/M2

## 2024-02-12 DIAGNOSIS — O21.9 NAUSEA AND VOMITING IN PREGNANCY: ICD-10-CM

## 2024-02-12 DIAGNOSIS — O23.01 PYELONEPHRITIS AFFECTING PREGNANCY IN FIRST TRIMESTER: ICD-10-CM

## 2024-02-12 DIAGNOSIS — Z34.92 PRENATAL CARE IN SECOND TRIMESTER: Primary | ICD-10-CM

## 2024-02-12 DIAGNOSIS — O09.899 SHORT INTERVAL BETWEEN PREGNANCIES AFFECTING PREGNANCY, ANTEPARTUM: ICD-10-CM

## 2024-02-12 PROCEDURE — G8427 DOCREV CUR MEDS BY ELIG CLIN: HCPCS | Performed by: OBSTETRICS & GYNECOLOGY

## 2024-02-12 PROCEDURE — G8484 FLU IMMUNIZE NO ADMIN: HCPCS | Performed by: OBSTETRICS & GYNECOLOGY

## 2024-02-12 PROCEDURE — G8420 CALC BMI NORM PARAMETERS: HCPCS | Performed by: OBSTETRICS & GYNECOLOGY

## 2024-02-12 PROCEDURE — 1036F TOBACCO NON-USER: CPT | Performed by: OBSTETRICS & GYNECOLOGY

## 2024-02-12 PROCEDURE — 99213 OFFICE O/P EST LOW 20 MIN: CPT | Performed by: OBSTETRICS & GYNECOLOGY

## 2024-02-12 NOTE — ASSESSMENT & PLAN NOTE
Treated for pyelo and sepsis 11/26/23 --> then retreated for UTI early December  - RAJANI 1/19 -- + enterococcus faecalis --> retreated and now on suppression  - continue keflex suppression throughout pregnancy  - repeat Urine culture next visit

## 2024-02-12 NOTE — PROGRESS NOTES
Return OB Office Visit    CC:   Chief Complaint   Patient presents with    Routine Prenatal Visit       HPI:  Pt seen and examined. No concerns/complaints. Denies VB, LOF, ctx. Maybe some small fetal flutters. No urinary symptoms today.    Maternal wellness questionnaire reviewed - no concerns today.     Objective:  /78 (Site: Right Upper Arm, Position: Sitting, Cuff Size: Medium Adult)   Pulse (!) 106   Temp 98.2 °F (36.8 °C) (Temporal)   Ht 1.575 m (5' 2\")   Wt 51 kg (112 lb 6.4 oz)   LMP  (LMP Unknown) Comment: Haven't had a period since having her 8 month old.  SpO2 99%   BMI 20.56 kg/m²   Gen: AO, NAD  Abd: Soft, NT  FHT: 152  FH: below U     Assessment/Plan:  23 y.o.  at 14w6d (Estimated Date of Delivery: 24) presents for MARCUS appointment:     Problem List Items Addressed This Visit          Digestive    Nausea and vomiting in pregnancy      Rx for meds at this time, advised to call if symptoms worsen for rx  - TWG: -0.5lbs             Genitourinary    Pyelonephritis affecting pregnancy in first trimester     Treated for pyelo and sepsis 23 --> then retreated for UTI early December  - RAJANI  -- + enterococcus faecalis --> retreated and now on suppression  - continue keflex suppression throughout pregnancy  - repeat Urine culture next visit             Other    Prenatal care in second trimester - Primary     - FWB: reassuring by doptones today  - Genetic screening: declined  - Anatomy scan: plan at 20 weeks  - Flu shot: declined  - Tdap: 28 weeks  - PNL: O+/ab-, RI, HepB/C neg, RPRnr, HIV neg, VZV non-immune, Hgb 13.0, UDS neg, GCCT neg, Ucx + (see below)          Short interval between pregnancies affecting pregnancy, antepartum     G1 delivered 2023             Dispo: RTC in 4 weeks with anatomy US  Emily Guzman MD

## 2024-03-13 ENCOUNTER — ROUTINE PRENATAL (OUTPATIENT)
Dept: OBGYN CLINIC | Age: 24
End: 2024-03-13
Payer: COMMERCIAL

## 2024-03-13 VITALS
HEART RATE: 65 BPM | SYSTOLIC BLOOD PRESSURE: 112 MMHG | TEMPERATURE: 98.2 F | DIASTOLIC BLOOD PRESSURE: 60 MMHG | WEIGHT: 118 LBS | BODY MASS INDEX: 21.58 KG/M2

## 2024-03-13 DIAGNOSIS — O23.01 PYELONEPHRITIS AFFECTING PREGNANCY IN FIRST TRIMESTER: ICD-10-CM

## 2024-03-13 DIAGNOSIS — O21.9 NAUSEA AND VOMITING IN PREGNANCY: ICD-10-CM

## 2024-03-13 DIAGNOSIS — O09.899 SHORT INTERVAL BETWEEN PREGNANCIES AFFECTING PREGNANCY, ANTEPARTUM: ICD-10-CM

## 2024-03-13 DIAGNOSIS — Z34.92 PRENATAL CARE IN SECOND TRIMESTER: Primary | ICD-10-CM

## 2024-03-13 PROCEDURE — G8420 CALC BMI NORM PARAMETERS: HCPCS | Performed by: OBSTETRICS & GYNECOLOGY

## 2024-03-13 PROCEDURE — 1036F TOBACCO NON-USER: CPT | Performed by: OBSTETRICS & GYNECOLOGY

## 2024-03-13 PROCEDURE — G8427 DOCREV CUR MEDS BY ELIG CLIN: HCPCS | Performed by: OBSTETRICS & GYNECOLOGY

## 2024-03-13 PROCEDURE — 99213 OFFICE O/P EST LOW 20 MIN: CPT | Performed by: OBSTETRICS & GYNECOLOGY

## 2024-03-13 PROCEDURE — G8484 FLU IMMUNIZE NO ADMIN: HCPCS | Performed by: OBSTETRICS & GYNECOLOGY

## 2024-03-13 NOTE — ASSESSMENT & PLAN NOTE
- FWB: reassuring by doptones today  - Genetic screening: declined  - Anatomy scan: 3/13/24: 306g, nl anatomy (suboptimal spine, diaphragm, feet, 4 chamber), CL wnl, nl fluid, anterior placenta/no previa    - completion anatomy at next visit  - Flu shot: declined  - Tdap: 28 weeks  - PNL: O+/ab-, RI, HepB/C neg, RPRnr, HIV neg, VZV non-immune, Hgb 13.0, UDS neg, GCCT neg, Ucx + (see below)

## 2024-03-13 NOTE — PROGRESS NOTES
Return OB Office Visit    CC:   Chief Complaint   Patient presents with    Routine Prenatal Visit    Pregnancy Ultrasound       HPI:  Pt seen and examined. No concerns/complaints. Denies VB, LOF, ctx. +FM. No urinary symptoms.     Maternal wellness questionnaire reviewed - no concerns today.     Objective:  /60   Pulse 65   Temp 98.2 °F (36.8 °C) (Temporal)   Wt 53.5 kg (118 lb)   LMP  (LMP Unknown) Comment: Haven't had a period since having her 8 month old.  BMI 21.58 kg/m²   Gen: AO, NAD  Abd: Soft, NT  FHT: 151    Assessment/Plan:  23 y.o.  at 19w1d (Estimated Date of Delivery: 24) presents for MARCUS appointment:     Problem List Items Addressed This Visit          Digestive    Nausea and vomiting in pregnancy     Symptoms improved/resolved  - TWlbs             Genitourinary    Pyelonephritis affecting pregnancy in first trimester     Treated for pyelo and sepsis 23 --> then retreated for UTI early December  - RAJANI  -- + enterococcus faecalis --> retreated and now on suppression  - continue keflex suppression throughout pregnancy  - repeat Urine culture sent today          Relevant Orders    Culture, Urine       Other    Prenatal care in second trimester - Primary     - FWB: reassuring by doptones today  - Genetic screening: declined  - Anatomy scan: 3/13/24: 306g, nl anatomy (suboptimal spine, diaphragm, feet, 4 chamber), CL wnl, nl fluid, anterior placenta/no previa    - completion anatomy at next visit  - Flu shot: declined  - Tdap: 28 weeks  - PNL: O+/ab-, RI, HepB/C neg, RPRnr, HIV neg, VZV non-immune, Hgb 13.0, UDS neg, GCCT neg, Ucx + (see below)          Short interval between pregnancies affecting pregnancy, antepartum     G1 delivered 2023             Dispo: RTC in 4 weeks with US  Emily Guzman MD

## 2024-03-13 NOTE — ASSESSMENT & PLAN NOTE
Treated for pyelo and sepsis 11/26/23 --> then retreated for UTI early December  - RAJANI 1/19 -- + enterococcus faecalis --> retreated and now on suppression  - continue keflex suppression throughout pregnancy  - repeat Urine culture sent today

## 2024-03-14 LAB — BACTERIA UR CULT: NORMAL

## 2024-04-09 ENCOUNTER — ROUTINE PRENATAL (OUTPATIENT)
Dept: OBGYN CLINIC | Age: 24
End: 2024-04-09
Payer: COMMERCIAL

## 2024-04-09 VITALS
DIASTOLIC BLOOD PRESSURE: 60 MMHG | SYSTOLIC BLOOD PRESSURE: 110 MMHG | BODY MASS INDEX: 23.05 KG/M2 | TEMPERATURE: 98 F | WEIGHT: 126 LBS | HEART RATE: 91 BPM

## 2024-04-09 DIAGNOSIS — O23.01 PYELONEPHRITIS AFFECTING PREGNANCY IN FIRST TRIMESTER: ICD-10-CM

## 2024-04-09 DIAGNOSIS — O21.9 NAUSEA AND VOMITING IN PREGNANCY: ICD-10-CM

## 2024-04-09 DIAGNOSIS — Z34.92 PRENATAL CARE IN SECOND TRIMESTER: Primary | ICD-10-CM

## 2024-04-09 PROCEDURE — G8427 DOCREV CUR MEDS BY ELIG CLIN: HCPCS | Performed by: OBSTETRICS & GYNECOLOGY

## 2024-04-09 PROCEDURE — 99213 OFFICE O/P EST LOW 20 MIN: CPT | Performed by: OBSTETRICS & GYNECOLOGY

## 2024-04-09 PROCEDURE — G8420 CALC BMI NORM PARAMETERS: HCPCS | Performed by: OBSTETRICS & GYNECOLOGY

## 2024-04-09 PROCEDURE — 1036F TOBACCO NON-USER: CPT | Performed by: OBSTETRICS & GYNECOLOGY

## 2024-04-09 NOTE — ASSESSMENT & PLAN NOTE
Treated for pyelo and sepsis 11/26/23 --> then retreated for UTI early December  - RAJANI 1/19 -- + enterococcus faecalis --> retreated and now on suppression  - continue keflex suppression throughout pregnancy  - repeat Urine culture negative

## 2024-04-09 NOTE — PROGRESS NOTES
Return OB Office Visit    CC:   Chief Complaint   Patient presents with    Routine Prenatal Visit     No questions or concerns  No bleeding or cramping.        HPI:  Pt seen and examined. No concerns/complaints. Denies VB, LOF, ctx. +FM.    Objective:  /60 (Site: Right Upper Arm, Position: Sitting, Cuff Size: Medium Adult)   Pulse 91   Temp 98 °F (36.7 °C) (Infrared)   Wt 57.2 kg (126 lb)   LMP  (LMP Unknown) Comment: Haven't had a period since having her 8 month old.  BMI 23.05 kg/m²   Gen: AO, NAD  Abd: Soft, NT  FHT: 151    Assessment/Plan:  23 y.o.  at 23w0d (Estimated Date of Delivery: 24) presents for MARCUS appointment:     Problem List Items Addressed This Visit          Digestive    Nausea and vomiting in pregnancy      Symptoms improved/resolved  - TWlbs            Genitourinary    Pyelonephritis affecting pregnancy in first trimester      Treated for pyelo and sepsis 23 --> then retreated for UTI early December  - RAJANI  -- + enterococcus faecalis --> retreated and now on suppression  - continue keflex suppression throughout pregnancy  - repeat Urine culture negative            Other    Prenatal care in second trimester - Primary     - FWB: reassuring by doptones today  - Genetic screening: declined  - Anatomy scan: 3/13/24: 306g, nl anatomy (suboptimal spine, diaphragm, feet, 4 chamber), CL wnl, nl fluid, anterior placenta/no previa    - 24: 581g (56%ile), nl completion anatomy  - Flu shot: declined  - Tdap: 28 weeks  - PNL: O+/ab-, RI, HepB/C neg, RPRnr, HIV neg, VZV non-immune, Hgb 13.0, UDS neg, GCCT neg, Ucx + (see below)    - GTT/CBC next visit             Dispo: RTC in 4 weeks, GTT/CBC, Tdap at that time  Emily Guzman MD

## 2024-04-09 NOTE — ASSESSMENT & PLAN NOTE
- FWB: reassuring by vishal today  - Genetic screening: declined  - Anatomy scan: 3/13/24: 306g, nl anatomy (suboptimal spine, diaphragm, feet, 4 chamber), CL wnl, nl fluid, anterior placenta/no previa    - 4/9/24: 581g (56%ile), nl completion anatomy  - Flu shot: declined  - Tdap: 28 weeks  - PNL: O+/ab-, RI, HepB/C neg, RPRnr, HIV neg, VZV non-immune, Hgb 13.0, UDS neg, GCCT neg, Ucx + (see below)    - GTT/CBC next visit

## 2024-05-08 ENCOUNTER — ROUTINE PRENATAL (OUTPATIENT)
Dept: OBGYN CLINIC | Age: 24
End: 2024-05-08
Payer: COMMERCIAL

## 2024-05-08 VITALS
TEMPERATURE: 98.6 F | OXYGEN SATURATION: 99 % | HEIGHT: 62 IN | BODY MASS INDEX: 24.25 KG/M2 | SYSTOLIC BLOOD PRESSURE: 110 MMHG | DIASTOLIC BLOOD PRESSURE: 62 MMHG | WEIGHT: 131.8 LBS | HEART RATE: 126 BPM

## 2024-05-08 DIAGNOSIS — O09.899 SHORT INTERVAL BETWEEN PREGNANCIES AFFECTING PREGNANCY, ANTEPARTUM: ICD-10-CM

## 2024-05-08 DIAGNOSIS — Z34.92 PRENATAL CARE IN SECOND TRIMESTER: Primary | ICD-10-CM

## 2024-05-08 DIAGNOSIS — O23.01 PYELONEPHRITIS AFFECTING PREGNANCY IN FIRST TRIMESTER: ICD-10-CM

## 2024-05-08 DIAGNOSIS — O21.9 NAUSEA AND VOMITING IN PREGNANCY: ICD-10-CM

## 2024-05-08 PROCEDURE — 1036F TOBACCO NON-USER: CPT | Performed by: OBSTETRICS & GYNECOLOGY

## 2024-05-08 PROCEDURE — G8428 CUR MEDS NOT DOCUMENT: HCPCS | Performed by: OBSTETRICS & GYNECOLOGY

## 2024-05-08 PROCEDURE — 99213 OFFICE O/P EST LOW 20 MIN: CPT | Performed by: OBSTETRICS & GYNECOLOGY

## 2024-05-08 PROCEDURE — G8420 CALC BMI NORM PARAMETERS: HCPCS | Performed by: OBSTETRICS & GYNECOLOGY

## 2024-05-08 RX ORDER — ONDANSETRON 4 MG/1
4 TABLET, ORALLY DISINTEGRATING ORAL EVERY 8 HOURS PRN
Qty: 10 TABLET | Refills: 1 | Status: SHIPPED | OUTPATIENT
Start: 2024-05-08

## 2024-05-08 NOTE — ASSESSMENT & PLAN NOTE
- FWB: reassuring by vishal today  - Genetic screening: declined  - Anatomy scan: 3/13/24: 306g, nl anatomy (suboptimal spine, diaphragm, feet, 4 chamber), CL wnl, nl fluid, anterior placenta/no previa    - 4/9/24: 581g (56%ile), nl completion anatomy  - Flu shot: declined  - Tdap: 28 weeks  - PNL: O+/ab-, RI, HepB/C neg, RPRnr, HIV neg, VZV non-immune, Hgb 13.0, UDS neg, GCCT neg, Ucx + (see below)    - GTT/CBC attempted today, pt vomited, will pretreat with zofran and have her return for nurse visit to retry

## 2024-05-08 NOTE — PROGRESS NOTES
Return OB Office Visit    CC:   Chief Complaint   Patient presents with    Routine Prenatal Visit       HPI:  Pt seen and examined. No concerns/complaints. Denies VB, LOF, ctx. +FM. No UTI symptoms. Just tired. During visit murray became dizzy and vomited after drinking her glucola. Symptoms improved after that with snacks and water as well.     Maternal wellness questionnaire reviewed - no concerns today.     Objective:  /62   Pulse (!) 126   Temp 98.6 °F (37 °C) (Temporal)   Ht 1.575 m (5' 2\")   Wt 59.8 kg (131 lb 12.8 oz)   LMP  (LMP Unknown) Comment: Haven't had a period since having her 8 month old.  SpO2 99%   BMI 24.11 kg/m²   Gen: AO, NAD  Abd: Soft, NT  FHT: 146  FH: 27cm, unk by Leopolds    Assessment/Plan:  23 y.o.  at 27w1d (Estimated Date of Delivery: 24) presents for MARCUS appointment:     Problem List Items Addressed This Visit       Pyelonephritis affecting pregnancy in first trimester     Treated for pyelo and sepsis 23 --> then retreated for UTI early December  - RAJANI  -- + enterococcus faecalis --> retreated and now on suppression  - continue keflex suppression throughout pregnancy  - repeat Urine culture negative          Prenatal care in second trimester - Primary     - FWB: reassuring by vishal today  - Genetic screening: declined  - Anatomy scan: 3/13/24: 306g, nl anatomy (suboptimal spine, diaphragm, feet, 4 chamber), CL wnl, nl fluid, anterior placenta/no previa    - 24: 581g (56%ile), nl completion anatomy  - Flu shot: declined  - Tdap: 28 weeks  - PNL: O+/ab-, RI, HepB/C neg, RPRnr, HIV neg, VZV non-immune, Hgb 13.0, UDS neg, GCCT neg, Ucx + (see below)    - GTT/CBC attempted today, pt vomited, will pretreat with zofran and have her return for nurse visit to retry          Nausea and vomiting in pregnancy     Symptoms improved/resolved  - TWlbs          Short interval between pregnancies affecting pregnancy, antepartum       Dispo: RTC in 2 weeks,

## 2024-05-09 ENCOUNTER — NURSE ONLY (OUTPATIENT)
Dept: OBGYN CLINIC | Age: 24
End: 2024-05-09
Payer: COMMERCIAL

## 2024-05-09 DIAGNOSIS — Z34.92 PRENATAL CARE IN SECOND TRIMESTER: Primary | ICD-10-CM

## 2024-05-09 PROCEDURE — 36415 COLL VENOUS BLD VENIPUNCTURE: CPT | Performed by: OBSTETRICS & GYNECOLOGY

## 2024-05-10 LAB
BASOPHILS # BLD: 0.1 K/UL (ref 0–0.2)
BASOPHILS NFR BLD: 0.7 %
DEPRECATED RDW RBC AUTO: 12.8 % (ref 12.4–15.4)
EOSINOPHIL # BLD: 0.2 K/UL (ref 0–0.6)
EOSINOPHIL NFR BLD: 1.8 %
GLUCOSE 1H P 50 G GLC PO SERPL-MCNC: 66 MG/DL
HCT VFR BLD AUTO: 35.4 % (ref 36–48)
HGB BLD-MCNC: 11.8 G/DL (ref 12–16)
LYMPHOCYTES # BLD: 1.9 K/UL (ref 1–5.1)
LYMPHOCYTES NFR BLD: 19.5 %
MCH RBC QN AUTO: 30.6 PG (ref 26–34)
MCV RBC AUTO: 91.5 FL (ref 80–100)
MONOCYTES # BLD: 0.7 K/UL (ref 0–1.3)
MONOCYTES NFR BLD: 6.8 %
NEUTROPHILS # BLD: 7 K/UL (ref 1.7–7.7)
NEUTROPHILS NFR BLD: 71.2 %
PLATELET # BLD AUTO: 272 K/UL (ref 135–450)
PMV BLD AUTO: 7.6 FL (ref 5–10.5)
RBC # BLD AUTO: 3.87 M/UL (ref 4–5.2)
WBC # BLD AUTO: 9.8 K/UL (ref 4–11)

## 2024-05-20 ENCOUNTER — ROUTINE PRENATAL (OUTPATIENT)
Dept: OBGYN CLINIC | Age: 24
End: 2024-05-20
Payer: COMMERCIAL

## 2024-05-20 VITALS
WEIGHT: 135.2 LBS | OXYGEN SATURATION: 98 % | HEART RATE: 103 BPM | DIASTOLIC BLOOD PRESSURE: 84 MMHG | TEMPERATURE: 98.7 F | SYSTOLIC BLOOD PRESSURE: 122 MMHG | BODY MASS INDEX: 24.88 KG/M2 | HEIGHT: 62 IN

## 2024-05-20 DIAGNOSIS — O23.01 PYELONEPHRITIS AFFECTING PREGNANCY IN FIRST TRIMESTER: ICD-10-CM

## 2024-05-20 DIAGNOSIS — O21.9 NAUSEA AND VOMITING IN PREGNANCY: ICD-10-CM

## 2024-05-20 DIAGNOSIS — Z34.93 PRENATAL CARE IN THIRD TRIMESTER: Primary | ICD-10-CM

## 2024-05-20 DIAGNOSIS — O09.899 SHORT INTERVAL BETWEEN PREGNANCIES AFFECTING PREGNANCY, ANTEPARTUM: ICD-10-CM

## 2024-05-20 PROCEDURE — G8428 CUR MEDS NOT DOCUMENT: HCPCS | Performed by: OBSTETRICS & GYNECOLOGY

## 2024-05-20 PROCEDURE — G8420 CALC BMI NORM PARAMETERS: HCPCS | Performed by: OBSTETRICS & GYNECOLOGY

## 2024-05-20 PROCEDURE — 99213 OFFICE O/P EST LOW 20 MIN: CPT | Performed by: OBSTETRICS & GYNECOLOGY

## 2024-05-20 PROCEDURE — 1036F TOBACCO NON-USER: CPT | Performed by: OBSTETRICS & GYNECOLOGY

## 2024-05-20 NOTE — PROGRESS NOTES
Return OB Office Visit    CC:   Chief Complaint   Patient presents with    Routine Prenatal Visit       HPI:  Pt seen and examined. No concerns/complaints. Denies VB, LOF, ctx. +FM.    Maternal wellness questionnaire reviewed - no concerns today.     Objective:  /84   Pulse (!) 103   Temp 98.7 °F (37.1 °C) (Temporal)   Ht 1.575 m (5' 2\")   Wt 61.3 kg (135 lb 3.2 oz)   LMP  (LMP Unknown) Comment: Haven't had a period since having her 8 month old.  SpO2 98%   BMI 24.73 kg/m²   Gen: AO, NAD  Abd: Soft, NT  FHT: 138  FH: 28cm, unk by Leopolds    Assessment/Plan:  23 y.o.  at 28w6d (Estimated Date of Delivery: 24) presents for MARCUS appointment:     Problem List Items Addressed This Visit       Pyelonephritis affecting pregnancy in first trimester     Treated for pyelo and sepsis 23 --> then retreated for UTI early December  - RAJANI  -- + enterococcus faecalis --> retreated and now on suppression  - continue keflex suppression throughout pregnancy  - repeat Urine culture negative          Prenatal care in third trimester - Primary     - FWB: reassuring by vishal today  - Genetic screening: declined  - Anatomy scan: 3/13/24: 306g, nl anatomy (suboptimal spine, diaphragm, feet, 4 chamber), CL wnl, nl fluid, anterior placenta/no previa    - 24: 581g (56%ile), nl completion anatomy  - Flu shot: declined  - Tdap: 28 weeks  - PNL: O+/ab-, RI, HepB/C neg, RPRnr, HIV neg, VZV non-immune, Hgb 13.0, UDS neg, GCCT neg, Ucx + (see below)    - 1hr GTT 66, Hgb 11.8, plt 272          Nausea and vomiting in pregnancy     Symptoms improved/resolved  - TWlbs          Short interval between pregnancies affecting pregnancy, antepartum       Dispo: RTC in 2 weeks  Emily Guzman MD

## 2024-05-20 NOTE — ASSESSMENT & PLAN NOTE
- FWB: reassuring by doptones today  - Genetic screening: declined  - Anatomy scan: 3/13/24: 306g, nl anatomy (suboptimal spine, diaphragm, feet, 4 chamber), CL wnl, nl fluid, anterior placenta/no previa    - 4/9/24: 581g (56%ile), nl completion anatomy  - Flu shot: declined  - Tdap: 28 weeks  - PNL: O+/ab-, RI, HepB/C neg, RPRnr, HIV neg, VZV non-immune, Hgb 13.0, UDS neg, GCCT neg, Ucx + (see below)    - 1hr GTT 66, Hgb 11.8, plt 272

## 2024-06-03 ENCOUNTER — ROUTINE PRENATAL (OUTPATIENT)
Dept: OBGYN CLINIC | Age: 24
End: 2024-06-03
Payer: COMMERCIAL

## 2024-06-03 VITALS
OXYGEN SATURATION: 98 % | DIASTOLIC BLOOD PRESSURE: 68 MMHG | WEIGHT: 135.4 LBS | TEMPERATURE: 98.3 F | HEIGHT: 62 IN | SYSTOLIC BLOOD PRESSURE: 126 MMHG | BODY MASS INDEX: 24.92 KG/M2 | HEART RATE: 97 BPM

## 2024-06-03 DIAGNOSIS — O23.01 PYELONEPHRITIS AFFECTING PREGNANCY IN FIRST TRIMESTER: ICD-10-CM

## 2024-06-03 DIAGNOSIS — Z34.93 PRENATAL CARE IN THIRD TRIMESTER: Primary | ICD-10-CM

## 2024-06-03 DIAGNOSIS — O21.9 NAUSEA AND VOMITING IN PREGNANCY: ICD-10-CM

## 2024-06-03 DIAGNOSIS — O09.899 SHORT INTERVAL BETWEEN PREGNANCIES AFFECTING PREGNANCY, ANTEPARTUM: ICD-10-CM

## 2024-06-03 PROCEDURE — 99213 OFFICE O/P EST LOW 20 MIN: CPT | Performed by: OBSTETRICS & GYNECOLOGY

## 2024-06-03 PROCEDURE — G8420 CALC BMI NORM PARAMETERS: HCPCS | Performed by: OBSTETRICS & GYNECOLOGY

## 2024-06-03 PROCEDURE — G8427 DOCREV CUR MEDS BY ELIG CLIN: HCPCS | Performed by: OBSTETRICS & GYNECOLOGY

## 2024-06-03 PROCEDURE — 1036F TOBACCO NON-USER: CPT | Performed by: OBSTETRICS & GYNECOLOGY

## 2024-06-03 NOTE — PROGRESS NOTES
Return OB Office Visit    CC:   Chief Complaint   Patient presents with    Routine Prenatal Visit       HPI:  Pt seen and examined. No concerns/complaints. Denies VB, LOF, ctx. +FM.    Objective:  /68   Pulse 97   Temp 98.3 °F (36.8 °C) (Temporal)   Ht 1.575 m (5' 2\")   Wt 61.4 kg (135 lb 6.4 oz)   LMP  (LMP Unknown) Comment: Haven't had a period since having her 8 month old.  SpO2 98%   BMI 24.76 kg/m²   Gen: AO, NAD  Abd: Soft, NT  FHT: 142  FH: 30, unk by Leopolds    Assessment/Plan:  23 y.o.  at 30w6d (Estimated Date of Delivery: 24) presents for MARCUS appointment:     Problem List Items Addressed This Visit       Pyelonephritis affecting pregnancy in first trimester     Treated for pyelo and sepsis 23 --> then retreated for UTI early December  - RAJANI  -- + enterococcus faecalis --> retreated and now on suppression  - continue keflex suppression throughout pregnancy  - repeat Urine culture negative          Prenatal care in third trimester - Primary     - FWB: reassuring by doptones today  - Genetic screening: declined  - Anatomy scan: 3/13/24: 306g, nl anatomy (suboptimal spine, diaphragm, feet, 4 chamber), CL wnl, nl fluid, anterior placenta/no previa    - 24: 581g (56%ile), nl completion anatomy  - Flu shot: declined  - Tdap: 28 weeks  - PNL: O+/ab-, RI, HepB/C neg, RPRnr, HIV neg, VZV non-immune, Hgb 13.0, UDS neg, GCCT neg, Ucx + (see below)    - 1hr GTT 66, Hgb 11.8, plt 272          Nausea and vomiting in pregnancy     Symptoms improved/resolved  - TWlbs          Short interval between pregnancies affecting pregnancy, antepartum       Dispo: RTC in 2 weeks  Emily Guzman MD

## 2024-06-20 ENCOUNTER — ROUTINE PRENATAL (OUTPATIENT)
Dept: OBGYN CLINIC | Age: 24
End: 2024-06-20
Payer: COMMERCIAL

## 2024-06-20 VITALS
BODY MASS INDEX: 25.02 KG/M2 | HEART RATE: 101 BPM | DIASTOLIC BLOOD PRESSURE: 68 MMHG | SYSTOLIC BLOOD PRESSURE: 110 MMHG | WEIGHT: 136.8 LBS

## 2024-06-20 DIAGNOSIS — Z34.93 PRENATAL CARE IN THIRD TRIMESTER: Primary | ICD-10-CM

## 2024-06-20 DIAGNOSIS — O21.9 NAUSEA AND VOMITING IN PREGNANCY: ICD-10-CM

## 2024-06-20 DIAGNOSIS — O09.899 SHORT INTERVAL BETWEEN PREGNANCIES AFFECTING PREGNANCY, ANTEPARTUM: ICD-10-CM

## 2024-06-20 DIAGNOSIS — O23.01 PYELONEPHRITIS AFFECTING PREGNANCY IN FIRST TRIMESTER: ICD-10-CM

## 2024-06-20 PROCEDURE — G8419 CALC BMI OUT NRM PARAM NOF/U: HCPCS | Performed by: OBSTETRICS & GYNECOLOGY

## 2024-06-20 PROCEDURE — 1036F TOBACCO NON-USER: CPT | Performed by: OBSTETRICS & GYNECOLOGY

## 2024-06-20 PROCEDURE — 99213 OFFICE O/P EST LOW 20 MIN: CPT | Performed by: OBSTETRICS & GYNECOLOGY

## 2024-06-20 PROCEDURE — G8427 DOCREV CUR MEDS BY ELIG CLIN: HCPCS | Performed by: OBSTETRICS & GYNECOLOGY

## 2024-06-20 NOTE — PROGRESS NOTES
Return OB Office Visit    CC:   Chief Complaint   Patient presents with    Routine Prenatal Visit       HPI:  Pt seen and examined. No concerns/complaints. Denies VB, LOF, ctx. +FM. Some occasional cramps. Having heartburn, no meds at this time.     Objective:  /68   Pulse (!) 101   Wt 62.1 kg (136 lb 12.8 oz)   LMP  (LMP Unknown) Comment: Haven't had a period since having her 8 month old.  BMI 25.02 kg/m²   Gen: AO, NAD  Abd: Soft, NT  FHT: 150  FH: 33cm, unk by Leopolds    Assessment/Plan:  23 y.o.  at 33w2d (Estimated Date of Delivery: 24) presents for MARCUS appointment:     Problem List Items Addressed This Visit       Pyelonephritis affecting pregnancy in first trimester     Treated for pyelo and sepsis 23 --> then retreated for UTI early December  - RAJANI  -- + enterococcus faecalis --> retreated and now on suppression  - continue keflex suppression throughout pregnancy  - repeat Urine culture negative          Prenatal care in third trimester - Primary     - FWB: reassuring by vishal today  - Genetic screening: declined  - Anatomy scan: 3/13/24: 306g, nl anatomy (suboptimal spine, diaphragm, feet, 4 chamber), CL wnl, nl fluid, anterior placenta/no previa    - 24: 581g (56%ile), nl completion anatomy  - Flu shot: declined  - Tdap: 28 weeks  - PNL: O+/ab-, RI, HepB/C neg, RPRnr, HIV neg, VZV non-immune, Hgb 13.0, UDS neg, GCCT neg, Ucx + (see below)    - 1hr GTT 66, Hgb 11.8, plt 272          Nausea and vomiting in pregnancy     Symptoms improved/resolved  - TWlbs          Short interval between pregnancies affecting pregnancy, antepartum       Dispo: RTC in 2 weeks with US Emily Guzman MD

## 2024-06-29 ENCOUNTER — HOSPITAL ENCOUNTER (OUTPATIENT)
Age: 24
Discharge: HOME | End: 2024-06-29
Attending: OBSTETRICS & GYNECOLOGY | Admitting: OBSTETRICS & GYNECOLOGY
Payer: COMMERCIAL

## 2024-06-29 VITALS
HEIGHT: 62 IN | DIASTOLIC BLOOD PRESSURE: 71 MMHG | BODY MASS INDEX: 25.03 KG/M2 | TEMPERATURE: 98.9 F | RESPIRATION RATE: 18 BRPM | OXYGEN SATURATION: 97 % | SYSTOLIC BLOOD PRESSURE: 113 MMHG | HEART RATE: 115 BPM | WEIGHT: 136 LBS

## 2024-06-29 LAB
BACTERIA URNS QL MICRO: ABNORMAL /HPF
BILIRUB UR QL STRIP.AUTO: NEGATIVE
CLARITY UR: CLEAR
COLOR UR: YELLOW
EPI CELLS #/AREA URNS HPF: ABNORMAL /HPF (ref 0–5)
GLUCOSE UR STRIP.AUTO-MCNC: NEGATIVE MG/DL
HGB UR QL STRIP.AUTO: NEGATIVE
KETONES UR STRIP.AUTO-MCNC: NEGATIVE MG/DL
LEUKOCYTE ESTERASE UR QL STRIP.AUTO: ABNORMAL
NITRITE UR QL STRIP.AUTO: NEGATIVE
PH UR STRIP.AUTO: 6.5 [PH] (ref 5–8)
PROT UR STRIP.AUTO-MCNC: NEGATIVE MG/DL
RBC #/AREA URNS HPF: ABNORMAL /HPF (ref 0–4)
SP GR UR STRIP.AUTO: 1.01 (ref 1–1.03)
UA DIPSTICK W REFLEX MICRO PNL UR: YES
URN SPEC COLLECT METH UR: ABNORMAL
UROBILINOGEN UR STRIP-ACNC: 0.2 E.U./DL
WBC #/AREA URNS HPF: ABNORMAL /HPF (ref 0–5)

## 2024-06-29 PROCEDURE — 81001 URINALYSIS AUTO W/SCOPE: CPT

## 2024-06-29 PROCEDURE — 99202 OFFICE O/P NEW SF 15 MIN: CPT

## 2024-06-29 NOTE — PROGRESS NOTES
Dr. Guzman notified of SVE and patient status. Order for discharge home and to follow up with scheduled prenatal appointment.

## 2024-06-29 NOTE — H&P
Department of Obstetrics and Gynecology  Labor and Delivery  House Officer Triage Note    -Pt seen for Dr. Thomas to/ [facility]     SUBJECTIVE:  22 y/o  @ 34.4 wks., EDC 24 presents to triage c/o intermittent \"points to inguinal area\" b/l pain lasting 3-4 seconds for the past \"few days.\" Last intercourse 1 wk. Ago. Denies LOF, VB, or contractions. Denies recent cervical exam, trauma, F/C/NS, N/V/D/C, HA, SOB, palpitations, difficulty walking, or urinary complaints.      OBJECTIVE    Vitals:    Vitals:    24 1829   BP: 113/71   Pulse: (!) 115   Resp: 18   Temp: 98.9 °F (37.2 °C)   TempSrc: Oral   SpO2: 97%   Weight: 61.7 kg (136 lb)   Height: 1.575 m (5' 2\")      CONSTITUTIONAL:  awake, alert, cooperative, no apparent distress, and appears stated age  LUNGS:  No increased work of breathing, good air exchange, clear to auscultation bilaterally, no crackles or wheezing  CARDIOVASCULAR:  Normal apical impulse, regular rate and rhythm,  ABDOMEN: Gravid, no scars, normal bowel sounds, soft, non-distended, non-tender, no masses palpated  EXT: No C/C/E    Cervix:             Dilation:  Closed         Effacement:  Long         Station:  -3 cm         Consistency:  firm         Position:  posterior    Fetal Position:  Cephalic    Membranes:  Intact    Fetal heart rate:         Baseline Heart Rate:  140's, Cat I tracing        Accelerations:  present       Decelerations:  absent       Variability:  moderate    Contraction frequency: irritability           Component  Ref Range & Units 24 1855 23 0924 23 1440 23 1807   Color, UA  Straw/Yellow Yellow  Yellow Yellow   Clarity, UA  Clear Clear  CLOUDY Abnormal  CLOUDY Abnormal    Glucose, Ur  Negative mg/dL Negative  Negative Negative   Bilirubin, Urine  Negative Negative  Negative Negative   Ketones, Urine  Negative mg/dL Negative  Negative 40 Abnormal    Specific Gravity, UA  1.005 - 1.030 1.015  1.026 1.025   Blood, Urine  Negative  d/w pt location most c/w round ligament pain  -UA - dirty catch - negative  -fetus - tracing reassuring       Attending provider Dr. Guzman updated regarding patient status.

## 2024-06-29 NOTE — PROGRESS NOTES
Pt verbalized understanding of verbal and written discharge instructions and denies having questions at this time. Pt left OB unit at 1954 ambulatory, undelivered, and in stable condition.

## 2024-06-29 NOTE — PROGRESS NOTES
Call placed to Dr. Guzman to inform her of pt's presence in T1 and stated complaints. Order received to collect and send UA. Once UA is resulted, Dr. Guzman would like house officer to see pt and perform SVE before d/c home. Pt may have oral hydration while in T1.

## 2024-06-29 NOTE — PROGRESS NOTES
Pt , 34w4d, presents to OB triage with complaints of occasional pelvic pin that shoot from low abdomen to right and left. Pt ambulated to T1. Pt denies leaking fluid, vaginal bleeding, trouble with urination, or recent sexual intercourse. Pt states she feels baby move as usual. RN name and number on white board. Pt oriented to room and shown how to use telephone to reach RN. Pt provided urine sample, was placed on external monitors, and vitals taken.

## 2024-07-05 ENCOUNTER — HOSPITAL ENCOUNTER (OUTPATIENT)
Age: 24
Discharge: HOME | End: 2024-07-05
Attending: OBSTETRICS & GYNECOLOGY | Admitting: OBSTETRICS & GYNECOLOGY
Payer: COMMERCIAL

## 2024-07-05 ENCOUNTER — TELEPHONE (OUTPATIENT)
Dept: OBGYN CLINIC | Age: 24
End: 2024-07-05

## 2024-07-05 VITALS — HEIGHT: 62 IN | WEIGHT: 136 LBS | BODY MASS INDEX: 25.03 KG/M2

## 2024-07-05 LAB
ALBUMIN SERPL-MCNC: 3.7 G/DL (ref 3.4–5)
ALBUMIN SERPL-MCNC: ABNORMAL G/DL (ref 3.4–5)
ALBUMIN/GLOB SERPL: 1.1 {RATIO} (ref 1.1–2.2)
ALBUMIN/GLOB SERPL: ABNORMAL {RATIO} (ref 1.1–2.2)
ALP SERPL-CCNC: 174 U/L (ref 40–129)
ALP SERPL-CCNC: ABNORMAL U/L (ref 40–129)
ALT SERPL-CCNC: 9 U/L (ref 10–40)
ALT SERPL-CCNC: ABNORMAL U/L (ref 10–40)
ANION GAP SERPL CALCULATED.3IONS-SCNC: 14 MMOL/L (ref 3–16)
ANION GAP SERPL CALCULATED.3IONS-SCNC: ABNORMAL MMOL/L (ref 3–16)
AST SERPL-CCNC: 15 U/L (ref 15–37)
AST SERPL-CCNC: ABNORMAL U/L (ref 15–37)
BASOPHILS # BLD: 0 K/UL (ref 0–0.2)
BASOPHILS # BLD: ABNORMAL K/UL (ref 0–0.2)
BASOPHILS NFR BLD: 0.2 %
BASOPHILS NFR BLD: ABNORMAL %
BILIRUB SERPL-MCNC: 0.3 MG/DL (ref 0–1)
BILIRUB SERPL-MCNC: ABNORMAL MG/DL (ref 0–1)
BILIRUB UR QL STRIP.AUTO: NEGATIVE
BUN SERPL-MCNC: 5 MG/DL (ref 7–20)
BUN SERPL-MCNC: ABNORMAL MG/DL (ref 7–20)
CALCIUM SERPL-MCNC: 8.6 MG/DL (ref 8.3–10.6)
CALCIUM SERPL-MCNC: ABNORMAL MG/DL (ref 8.3–10.6)
CHLORIDE SERPL-SCNC: 101 MMOL/L (ref 99–110)
CHLORIDE SERPL-SCNC: ABNORMAL MMOL/L (ref 99–110)
CLARITY UR: CLEAR
CO2 SERPL-SCNC: 21 MMOL/L (ref 21–32)
CO2 SERPL-SCNC: ABNORMAL MMOL/L (ref 21–32)
COLOR UR: YELLOW
CREAT SERPL-MCNC: <0.5 MG/DL (ref 0.6–1.1)
CREAT SERPL-MCNC: ABNORMAL MG/DL (ref 0.6–1.1)
DEPRECATED RDW RBC AUTO: 12.9 % (ref 12.4–15.4)
DEPRECATED RDW RBC AUTO: ABNORMAL % (ref 12.4–15.4)
EOSINOPHIL # BLD: 0.1 K/UL (ref 0–0.6)
EOSINOPHIL # BLD: ABNORMAL K/UL (ref 0–0.6)
EOSINOPHIL NFR BLD: 0.6 %
EOSINOPHIL NFR BLD: ABNORMAL %
EPI CELLS #/AREA URNS HPF: ABNORMAL /HPF (ref 0–5)
GFR SERPLBLD CREATININE-BSD FMLA CKD-EPI: >90 ML/MIN/{1.73_M2}
GFR SERPLBLD CREATININE-BSD FMLA CKD-EPI: ABNORMAL ML/MIN/{1.73_M2}
GLUCOSE SERPL-MCNC: 91 MG/DL (ref 70–99)
GLUCOSE SERPL-MCNC: ABNORMAL MG/DL (ref 70–99)
GLUCOSE UR STRIP.AUTO-MCNC: NEGATIVE MG/DL
HCT VFR BLD AUTO: 32.7 % (ref 36–48)
HCT VFR BLD AUTO: ABNORMAL % (ref 36–48)
HGB BLD-MCNC: 10.8 G/DL (ref 12–16)
HGB BLD-MCNC: ABNORMAL G/DL (ref 12–16)
HGB UR QL STRIP.AUTO: ABNORMAL
KETONES UR STRIP.AUTO-MCNC: NEGATIVE MG/DL
LEUKOCYTE ESTERASE UR QL STRIP.AUTO: ABNORMAL
LYMPHOCYTES # BLD: 2 K/UL (ref 1–5.1)
LYMPHOCYTES # BLD: ABNORMAL K/UL (ref 1–5.1)
LYMPHOCYTES NFR BLD: 15.4 %
LYMPHOCYTES NFR BLD: ABNORMAL %
MAGNESIUM SERPL-MCNC: 1.9 MG/DL (ref 1.8–2.4)
MAGNESIUM SERPL-MCNC: ABNORMAL MG/DL (ref 1.8–2.4)
MCH RBC QN AUTO: 28.4 PG (ref 26–34)
MCH RBC QN AUTO: ABNORMAL PG (ref 26–34)
MCHC RBC AUTO-ENTMCNC: 33 G/DL (ref 31–36)
MCHC RBC AUTO-ENTMCNC: ABNORMAL G/DL (ref 31–36)
MCV RBC AUTO: 85.9 FL (ref 80–100)
MCV RBC AUTO: ABNORMAL FL (ref 80–100)
MONOCYTES # BLD: 0.7 K/UL (ref 0–1.3)
MONOCYTES # BLD: ABNORMAL K/UL (ref 0–1.3)
MONOCYTES NFR BLD: 5.6 %
MONOCYTES NFR BLD: ABNORMAL %
NEUTROPHILS # BLD: 10.3 K/UL (ref 1.7–7.7)
NEUTROPHILS # BLD: ABNORMAL K/UL (ref 1.7–7.7)
NEUTROPHILS NFR BLD: 78.2 %
NEUTROPHILS NFR BLD: ABNORMAL %
NITRITE UR QL STRIP.AUTO: NEGATIVE
PH UR STRIP.AUTO: 6 [PH] (ref 5–8)
PLATELET # BLD AUTO: 265 K/UL (ref 135–450)
PLATELET # BLD AUTO: ABNORMAL K/UL (ref 135–450)
PMV BLD AUTO: 7.2 FL (ref 5–10.5)
PMV BLD AUTO: ABNORMAL FL (ref 5–10.5)
POTASSIUM SERPL-SCNC: 3.6 MMOL/L (ref 3.5–5.1)
POTASSIUM SERPL-SCNC: ABNORMAL MMOL/L (ref 3.5–5.1)
PROT SERPL-MCNC: 7 G/DL (ref 6.4–8.2)
PROT SERPL-MCNC: ABNORMAL G/DL (ref 6.4–8.2)
PROT UR STRIP.AUTO-MCNC: 100 MG/DL
RBC # BLD AUTO: 3.81 M/UL (ref 4–5.2)
RBC # BLD AUTO: ABNORMAL M/UL (ref 4–5.2)
RBC #/AREA URNS HPF: ABNORMAL /HPF (ref 0–4)
SODIUM SERPL-SCNC: 136 MMOL/L (ref 136–145)
SODIUM SERPL-SCNC: ABNORMAL MMOL/L (ref 136–145)
SP GR UR STRIP.AUTO: >=1.03 (ref 1–1.03)
UA DIPSTICK W REFLEX MICRO PNL UR: YES
URN SPEC COLLECT METH UR: ABNORMAL
UROBILINOGEN UR STRIP-ACNC: 0.2 E.U./DL
WBC # BLD AUTO: 13.1 K/UL (ref 4–11)
WBC # BLD AUTO: ABNORMAL K/UL (ref 4–11)
WBC #/AREA URNS HPF: ABNORMAL /HPF (ref 0–5)

## 2024-07-05 PROCEDURE — 59025 FETAL NON-STRESS TEST: CPT

## 2024-07-05 PROCEDURE — 99211 OFF/OP EST MAY X REQ PHY/QHP: CPT

## 2024-07-05 PROCEDURE — 81001 URINALYSIS AUTO W/SCOPE: CPT

## 2024-07-05 PROCEDURE — 83735 ASSAY OF MAGNESIUM: CPT

## 2024-07-05 PROCEDURE — 85025 COMPLETE CBC W/AUTO DIFF WBC: CPT

## 2024-07-05 PROCEDURE — 36415 COLL VENOUS BLD VENIPUNCTURE: CPT

## 2024-07-05 PROCEDURE — 80053 COMPREHEN METABOLIC PANEL: CPT

## 2024-07-05 PROCEDURE — 99214 OFFICE O/P EST MOD 30 MIN: CPT | Performed by: OBSTETRICS & GYNECOLOGY

## 2024-07-05 RX ORDER — ONDANSETRON 4 MG/1
4 TABLET, ORALLY DISINTEGRATING ORAL EVERY 8 HOURS PRN
Status: DISCONTINUED | OUTPATIENT
Start: 2024-07-05 | End: 2024-07-05 | Stop reason: HOSPADM

## 2024-07-05 NOTE — H&P
Obstetrics Triage History and Physical    CC: nausea, vomiting and cramping this AM    HPI: Diann Roach is a 23 y.o.  at 35w3d who presents with multiple complaints. States she has been having lower abdominal cramping/pain since last week (seen in triage on ), but has gotten worse since that time. Feels like period cramps, worse on the right side. Denies any vaginal bleeding or LOF, +FM. Also has been having nausea and vomiting, worse since she got up this AM. Has not used any medication for it. +diarrhea as well. No fevers, chills. Denies dysuria/hematuria. Does report some occasional chest heaviness, causes her to get dizzy. Lasts a few minutes and then goes away. No chest pain/SOB.      Review of Systems: The following ROS was otherwise negative, except as noted in the HPI: constitutional, HEENT, respiratory, cardiovascular, gastrointestinal, genitourinary, skin, musculoskeletal, neurological, psych.     OBGYN Provider : Thomas    Obstetrical History:  OB History    Para Term  AB Living   2 1 1 0 0 1   SAB IAB Ectopic Molar Multiple Live Births   0 0 0 0 0 1      # Outcome Date GA Lbr González/2nd Weight Sex Delivery Anes PTL Lv   2 Current            1 Term 23 37w0d  3.204 kg (7 lb 1 oz) M Vag-Spont   LORAINE      Complications: Pyelonephritis       Gynecologic History:  Denies hx of abnl pap smears.   Denies hx of STIs.    Past Medical History:   History reviewed. No pertinent past medical history.    Medications:  No current facility-administered medications on file prior to encounter.     Current Outpatient Medications on File Prior to Encounter   Medication Sig Dispense Refill    ondansetron (ZOFRAN-ODT) 4 MG disintegrating tablet Take 1 tablet by mouth every 8 hours as needed for Nausea 10 tablet 1    Prenatal Vit-DSS-Fe Cbn-FA (PRENATAL AD PO) Take by mouth      cephALEXin (KEFLEX) 500 MG capsule Take 1 capsule by mouth at bedtime 30 capsule 4       Allergies:  Oxycodone, Percocet  Phosphatase 2024 174 (H)     ALT 2024 9 (L)     AST 2024 15     Magnesium 2024 1.90     WBC 2024 13.1 (H)     RBC 2024 3.81 (L)     Hemoglobin 2024 10.8 (L)     Hematocrit 2024 32.7 (L)     MCV 2024 85.9     MCH 2024 28.4     MCHC 2024 33.0     RDW 2024 12.9     Platelets 2024 265     MPV 2024 7.2     Neutrophils % 2024 78.2     Lymphocytes % 2024 15.4     Monocytes % 2024 5.6     Eosinophils % 2024 0.6     Basophils % 2024 0.2     Neutrophils Absolute 2024 10.3 (H)     Lymphocytes Absolute 2024 2.0     Monocytes Absolute 2024 0.7     Eosinophils Absolute 2024 0.1     Basophils Absolute 2024 0.0        Assessment/Plan:   23 y.o.  at 35w3d here with nausea/vomiting and cramping    FWB - reassuring  - reactive     2. Nausea/vomiting  - improved after zofran, able to tolerate PO crackers/water, no additional chest pressure as well, ?heartburn     3. Cramping  - some contractions and irritability noted while in triage, SVE closed and unchagned over several hour recheck in triage  - CBC/CMP as above stable, mild leukocytosis suspect related to normal pregnancy changes  - return/labor precautions discussed    Dispo: d/c home, f/u in office as scheduled next week  Emily Guzman MD

## 2024-07-05 NOTE — DISCHARGE INSTRUCTIONS
Urinary Tract Infection in Pregnancy: After Your Visit   Your Care Instructions   A urinary tract infection, or UTI, is an infection of the bladder and other urinary structures. Most UTIs occur in the bladder. They often cause pain or burning when you urinate. UTI is the most common bacterial infection in pregnancy. If untreated, a UTI could lead to problems such as a kidney infection or  labor.   Most UTIs can be cured with antibiotics. Your doctor will prescribe an antibiotic that is safe during pregnancy. Be sure to finish your medicine so that the infection does not spread to your kidneys.     Follow-up care is a key part of your treatment and safety. Be sure to make and go to all appointments, and call your doctor if you are having problems. It’s also a good idea to know your test results and keep a list of the medicines you take.     How can you care for yourself at home?   Take your antibiotics as directed. Do not stop taking them just because you feel better. You need to take the full course of antibiotics.   Drink extra water and other fluids for the next day or two. This will help wash out the bacteria causing the infection. If you have kidney, heart, or liver disease and have to limit fluids, talk with your doctor before you increase the amount of fluids you drink.   Drink cranberry juice to help fight bacteria in the bladder.   Do not drink alcohol.   Urinate often. Try to empty your bladder each time.    Preventing UTIs   Drink plenty of fluids, enough so that your urine is light yellow or clear like water. This helps you urinate often, which clears bacteria from your system. If you have kidney, heart, or liver disease and have to limit fluids, talk with your doctor before you increase the amount of fluids you drink.   Drink cranberry juice.   Urinate when you first have the urge.   Urinate right after you have sex. This is the best way for women to avoid UTIs.   When going to the bathroom, wipe

## 2024-07-05 NOTE — TELEPHONE ENCOUNTER
Patient is 35w3d. Patient calling to advice office that she is on her way to L&D for evaluation.    Patient reports round ligament pain and cramping. Denies leaking of vaginal fluids or vaginal bleeding.     States that she has had nausea and vomiting since waking this morning. States that prescribed zofran is ineffective.     Unable to confirm fluid intake from yesterday.     Discussed with patient the need for increased fluids through pregnancy.     Asked if she would like to wait for Dr Guzman's recombination. Patient denies and states that she is heading directly to L&D.     Called and discussed with MD on call Dr VALENCIA

## 2024-07-05 NOTE — PROGRESS NOTES
24yo  presented to triage at 35.3wks with c/o nausea and vomiting since this morning and cramping.  Fetus has decreased movement this morning but is moving \"a lot\" now.  Monitors placed, Hx obtained.  Consent signed.

## 2024-07-06 NOTE — PROGRESS NOTES
Discharge order received.  Discharge instructions given.  Allowed time for questions/anseris.  Verbalizes understanding of all instructions given.  Ambulated out undelivered, not in active labor.

## 2024-07-08 ENCOUNTER — ROUTINE PRENATAL (OUTPATIENT)
Dept: OBGYN CLINIC | Age: 24
End: 2024-07-08
Payer: COMMERCIAL

## 2024-07-08 VITALS
SYSTOLIC BLOOD PRESSURE: 105 MMHG | OXYGEN SATURATION: 96 % | DIASTOLIC BLOOD PRESSURE: 60 MMHG | WEIGHT: 139.6 LBS | HEART RATE: 99 BPM | BODY MASS INDEX: 25.53 KG/M2 | TEMPERATURE: 97.4 F

## 2024-07-08 DIAGNOSIS — O23.01 PYELONEPHRITIS AFFECTING PREGNANCY IN FIRST TRIMESTER: ICD-10-CM

## 2024-07-08 DIAGNOSIS — R93.89 ABNORMAL ULTRASOUND: ICD-10-CM

## 2024-07-08 DIAGNOSIS — O09.899 SHORT INTERVAL BETWEEN PREGNANCIES AFFECTING PREGNANCY, ANTEPARTUM: ICD-10-CM

## 2024-07-08 DIAGNOSIS — Z34.93 PRENATAL CARE IN THIRD TRIMESTER: Primary | ICD-10-CM

## 2024-07-08 PROCEDURE — G8427 DOCREV CUR MEDS BY ELIG CLIN: HCPCS | Performed by: OBSTETRICS & GYNECOLOGY

## 2024-07-08 PROCEDURE — 1036F TOBACCO NON-USER: CPT | Performed by: OBSTETRICS & GYNECOLOGY

## 2024-07-08 PROCEDURE — 99213 OFFICE O/P EST LOW 20 MIN: CPT | Performed by: OBSTETRICS & GYNECOLOGY

## 2024-07-08 PROCEDURE — G8419 CALC BMI OUT NRM PARAM NOF/U: HCPCS | Performed by: OBSTETRICS & GYNECOLOGY

## 2024-07-08 NOTE — ASSESSMENT & PLAN NOTE
- FWB: reassuring by doptones today  - Genetic screening: declined  - Anatomy scan: 3/13/24: 306g, nl anatomy (suboptimal spine, diaphragm, feet, 4 chamber), CL wnl, nl fluid, anterior placenta/no previa    - 4/9/24: 581g (56%ile), nl completion anatomy  - Flu shot: declined  - Tdap: 28 weeks  - PNL: O+/ab-, RI, HepB/C neg, RPRnr, HIV neg, VZV non-immune, Hgb 13.0, UDS neg, GCCT neg, Ucx + (see below)    - 1hr GTT 66, Hgb 11.8, plt 272    - GBS sent today

## 2024-07-08 NOTE — PROGRESS NOTES
Return OB Office Visit    CC:   Chief Complaint   Patient presents with    Routine Prenatal Visit       HPI:  Pt seen and examined. No concerns/complaints. Denies VB, LOF, ctx. +FM. Was seen in triage on 24, feeling better since that time. Only occasional cramping, nothing like the other day.     Maternal wellness questionnaire reviewed - no concerns today.     Objective:  /60   Pulse 99   Temp 97.4 °F (36.3 °C) (Infrared)   Wt 63.3 kg (139 lb 9.6 oz)   LMP  (LMP Unknown) Comment: Haven't had a period since having her 8 month old.  SpO2 96%   BMI 25.53 kg/m²   Gen: AO, NAD  Abd: Soft, NT  FHT: 140    Assessment/Plan:  23 y.o.  at 35w6d (Estimated Date of Delivery: 24) presents for MARCUS appointment:     Problem List Items Addressed This Visit       Pyelonephritis affecting pregnancy in first trimester     Treated for pyelo and sepsis 23 --> then retreated for UTI early December  - RAJANI  -- + enterococcus faecalis --> retreated and now on suppression  - continue keflex suppression throughout pregnancy  - repeat Urine culture negative          Prenatal care in third trimester - Primary     - FWB: reassuring by doptones today  - Genetic screening: declined  - Anatomy scan: 3/13/24: 306g, nl anatomy (suboptimal spine, diaphragm, feet, 4 chamber), CL wnl, nl fluid, anterior placenta/no previa    - 24: 581g (56%ile), nl completion anatomy  - Flu shot: declined  - Tdap: 28 weeks  - PNL: O+/ab-, RI, HepB/C neg, RPRnr, HIV neg, VZV non-immune, Hgb 13.0, UDS neg, GCCT neg, Ucx + (see below)    - 1hr GTT 66, Hgb 11.8, plt 272    - GBS sent today          Relevant Orders    Culture, Strep B Screen, Vaginal/Rectal    Short interval between pregnancies affecting pregnancy, antepartum     Other Visit Diagnoses       Abnormal ultrasound        EFW wnl (43%ile), BPD 1.7%ile which is just within 2SD from normal. Reviewed MFM referral, obs, repeat US here -- plan to obs for now, recheck in 2 weeks

## 2024-07-12 LAB — GP B STREP SPEC QL CULT: NORMAL

## 2024-07-16 ENCOUNTER — ROUTINE PRENATAL (OUTPATIENT)
Dept: OBGYN CLINIC | Age: 24
End: 2024-07-16
Payer: COMMERCIAL

## 2024-07-16 VITALS
BODY MASS INDEX: 25.65 KG/M2 | TEMPERATURE: 98.3 F | HEIGHT: 62 IN | DIASTOLIC BLOOD PRESSURE: 78 MMHG | WEIGHT: 139.4 LBS | OXYGEN SATURATION: 96 % | SYSTOLIC BLOOD PRESSURE: 116 MMHG | HEART RATE: 112 BPM

## 2024-07-16 DIAGNOSIS — O09.899 SHORT INTERVAL BETWEEN PREGNANCIES AFFECTING PREGNANCY, ANTEPARTUM: ICD-10-CM

## 2024-07-16 DIAGNOSIS — O23.01 PYELONEPHRITIS AFFECTING PREGNANCY IN FIRST TRIMESTER: ICD-10-CM

## 2024-07-16 DIAGNOSIS — R93.89 ABNORMAL ULTRASOUND: ICD-10-CM

## 2024-07-16 DIAGNOSIS — Z34.93 PRENATAL CARE IN THIRD TRIMESTER: Primary | ICD-10-CM

## 2024-07-16 PROCEDURE — G8427 DOCREV CUR MEDS BY ELIG CLIN: HCPCS | Performed by: OBSTETRICS & GYNECOLOGY

## 2024-07-16 PROCEDURE — 1036F TOBACCO NON-USER: CPT | Performed by: OBSTETRICS & GYNECOLOGY

## 2024-07-16 PROCEDURE — 99213 OFFICE O/P EST LOW 20 MIN: CPT | Performed by: OBSTETRICS & GYNECOLOGY

## 2024-07-16 PROCEDURE — G8419 CALC BMI OUT NRM PARAM NOF/U: HCPCS | Performed by: OBSTETRICS & GYNECOLOGY

## 2024-07-16 NOTE — PROGRESS NOTES
Return OB Office Visit    CC:   Chief Complaint   Patient presents with    Routine Prenatal Visit       HPI:  Pt seen and examined. No concerns/complaints. Denies VB, LOF, ctx. +FM. Still having pelvic pressure/cramping.     Maternal wellness questionnaire reviewed - no concerns today.     Objective:  /78   Pulse (!) 112   Temp 98.3 °F (36.8 °C) (Temporal)   Ht 1.575 m (5' 2\")   Wt 63.2 kg (139 lb 6.4 oz)   LMP  (LMP Unknown) Comment: Haven't had a period since having her 8 month old.  SpO2 96%   BMI 25.50 kg/m²   Gen: AO, NAD  Abd: Soft, NT  FHT: 145  FH: 36cm, vertex by Leopolds  SVE: /3    Assessment/Plan:  23 y.o.  at 37w0d (Estimated Date of Delivery: 24) presents for MARCUS appointment:     Problem List Items Addressed This Visit       Pyelonephritis affecting pregnancy in first trimester     Treated for pyelo and sepsis 23 --> then retreated for UTI early December  - RAJANI  -- + enterococcus faecalis --> retreated and now on suppression  - continue keflex suppression throughout pregnancy  - repeat Urine culture negative          Prenatal care in third trimester - Primary     - FWB: reassuring by vishal today  - Genetic screening: declined  - Anatomy scan: 3/13/24: 306g, nl anatomy (suboptimal spine, diaphragm, feet, 4 chamber), CL wnl, nl fluid, anterior placenta/no previa    - 24: 581g (56%ile), nl completion anatomy  - Flu shot: declined  - Tdap: 28 weeks  - PNL: O+/ab-, RI, HepB/C neg, RPRnr, HIV neg, VZV non-immune, Hgb 13.0, UDS neg, GCCT neg, Ucx + (see below)    - 1hr GTT 66, Hgb 11.8, plt 272    - GBS neg    Plan to schedule IOL next visit around LULU          Short interval between pregnancies affecting pregnancy, antepartum     G1 delivered 2023           Other Visit Diagnoses       Abnormal ultrasound        EFW wnl (43%ile), BPD 1.7%ile which is just within 2SD from normal. Reviewed MFM referral, obs, repeat US here -- plan to obs for now, recheck at

## 2024-07-16 NOTE — ASSESSMENT & PLAN NOTE
- FWB: reassuring by vishal today  - Genetic screening: declined  - Anatomy scan: 3/13/24: 306g, nl anatomy (suboptimal spine, diaphragm, feet, 4 chamber), CL wnl, nl fluid, anterior placenta/no previa    - 4/9/24: 581g (56%ile), nl completion anatomy  - Flu shot: declined  - Tdap: 28 weeks  - PNL: O+/ab-, RI, HepB/C neg, RPRnr, HIV neg, VZV non-immune, Hgb 13.0, UDS neg, GCCT neg, Ucx + (see below)    - 1hr GTT 66, Hgb 11.8, plt 272    - GBS neg    Plan to schedule IOL next visit around LULU

## 2024-07-23 ENCOUNTER — ROUTINE PRENATAL (OUTPATIENT)
Dept: OBGYN CLINIC | Age: 24
End: 2024-07-23
Payer: COMMERCIAL

## 2024-07-23 VITALS
TEMPERATURE: 98.3 F | DIASTOLIC BLOOD PRESSURE: 68 MMHG | HEART RATE: 115 BPM | WEIGHT: 140.4 LBS | HEIGHT: 62 IN | SYSTOLIC BLOOD PRESSURE: 110 MMHG | BODY MASS INDEX: 25.83 KG/M2

## 2024-07-23 DIAGNOSIS — O09.899 SHORT INTERVAL BETWEEN PREGNANCIES AFFECTING PREGNANCY, ANTEPARTUM: ICD-10-CM

## 2024-07-23 DIAGNOSIS — Z34.93 PRENATAL CARE IN THIRD TRIMESTER: Primary | ICD-10-CM

## 2024-07-23 DIAGNOSIS — O23.01 PYELONEPHRITIS AFFECTING PREGNANCY IN FIRST TRIMESTER: ICD-10-CM

## 2024-07-23 DIAGNOSIS — R93.89 ABNORMAL ULTRASOUND: ICD-10-CM

## 2024-07-23 PROCEDURE — G8427 DOCREV CUR MEDS BY ELIG CLIN: HCPCS | Performed by: OBSTETRICS & GYNECOLOGY

## 2024-07-23 PROCEDURE — G8419 CALC BMI OUT NRM PARAM NOF/U: HCPCS | Performed by: OBSTETRICS & GYNECOLOGY

## 2024-07-23 PROCEDURE — 1036F TOBACCO NON-USER: CPT | Performed by: OBSTETRICS & GYNECOLOGY

## 2024-07-23 PROCEDURE — 99213 OFFICE O/P EST LOW 20 MIN: CPT | Performed by: OBSTETRICS & GYNECOLOGY

## 2024-07-23 NOTE — ASSESSMENT & PLAN NOTE
- FWB: reassuring by vishal today  - Genetic screening: declined  - Anatomy scan: 3/13/24: 306g, nl anatomy (suboptimal spine, diaphragm, feet, 4 chamber), CL wnl, nl fluid, anterior placenta/no previa    - 4/9/24: 581g (56%ile), nl completion anatomy  - Flu shot: declined  - Tdap: 28 weeks  - PNL: O+/ab-, RI, HepB/C neg, RPRnr, HIV neg, VZV non-immune, Hgb 13.0, UDS neg, GCCT neg, Ucx + (see below)    - 1hr GTT 66, Hgb 11.8, plt 272    - GBS neg    Plan to schedule IOL next visit around LULU if undelivered prior

## 2024-07-23 NOTE — PROGRESS NOTES
Return OB Office Visit    CC:   Chief Complaint   Patient presents with    Annual Exam       HPI:  Pt seen and examined. No concerns/complaints. Denies VB, LOF. +FM. Has had a lot of cramps overnight last night and pelvic pressure.     Maternal wellness questionnaire reviewed - no concerns today.     Objective:  /68   Pulse (!) 115   Temp 98.3 °F (36.8 °C) (Temporal)   Ht 1.575 m (5' 2\")   Wt 63.7 kg (140 lb 6.4 oz)   LMP  (LMP Unknown) Comment: Haven't had a period since having her 8 month old.  BMI 25.68 kg/m²   Gen: AO, NAD  Abd: Soft, NT  FHT: 153  SVE: /-2, soft    Assessment/Plan:  23 y.o.  at 38w0d (Estimated Date of Delivery: 24) presents for MARCUS appointment:     Problem List Items Addressed This Visit       Pyelonephritis affecting pregnancy in first trimester     Treated for pyelo and sepsis 23 --> then retreated for UTI early December  - RAJANI  -- + enterococcus faecalis --> retreated and now on suppression  - continue keflex suppression throughout pregnancy  - repeat Urine culture negative          Prenatal care in third trimester - Primary     - FWB: reassuring by vishal today  - Genetic screening: declined  - Anatomy scan: 3/13/24: 306g, nl anatomy (suboptimal spine, diaphragm, feet, 4 chamber), CL wnl, nl fluid, anterior placenta/no previa    - 24: 581g (56%ile), nl completion anatomy  - Flu shot: declined  - Tdap: 28 weeks  - PNL: O+/ab-, RI, HepB/C neg, RPRnr, HIV neg, VZV non-immune, Hgb 13.0, UDS neg, GCCT neg, Ucx + (see below)    - 1hr GTT 66, Hgb 11.8, plt 272    - GBS neg    Plan to schedule IOL next visit around LULU if undelivered prior          Short interval between pregnancies affecting pregnancy, antepartum     G1 delivered 2023           Other Visit Diagnoses       Abnormal ultrasound        BPD still small today, HC and overall growth wnl (EFW 3363g -- 61%ile), reviewed ddx, plan for  eval            Dispo: RTC in 1 week  Emily

## 2024-07-28 ENCOUNTER — HOSPITAL ENCOUNTER (INPATIENT)
Age: 24
LOS: 1 days | Discharge: HOME | DRG: 560 | End: 2024-07-29
Attending: OBSTETRICS & GYNECOLOGY | Admitting: OBSTETRICS & GYNECOLOGY
Payer: COMMERCIAL

## 2024-07-28 PROBLEM — Z37.9 NORMAL LABOR: Status: ACTIVE | Noted: 2024-07-28

## 2024-07-28 LAB
ABO + RH BLD: NORMAL
AMPHETAMINES UR QL SCN>1000 NG/ML: NORMAL
BARBITURATES UR QL SCN>200 NG/ML: NORMAL
BENZODIAZ UR QL SCN>200 NG/ML: NORMAL
BLD GP AB SCN SERPL QL: NORMAL
BUPRENORPHINE+NOR UR QL SCN: NORMAL
CANNABINOIDS UR QL SCN>50 NG/ML: NORMAL
COCAINE UR QL SCN: NORMAL
DEPRECATED RDW RBC AUTO: 13.2 % (ref 12.4–15.4)
DRUG SCREEN COMMENT UR-IMP: NORMAL
FENTANYL SCREEN, URINE: NORMAL
HCT VFR BLD AUTO: 34.8 % (ref 36–48)
HGB BLD-MCNC: 11 G/DL (ref 12–16)
MCH RBC QN AUTO: 26.5 PG (ref 26–34)
MCHC RBC AUTO-ENTMCNC: 31.7 G/DL (ref 31–36)
MCV RBC AUTO: 83.6 FL (ref 80–100)
METHADONE UR QL SCN>300 NG/ML: NORMAL
OPIATES UR QL SCN>300 NG/ML: NORMAL
OXYCODONE UR QL SCN: NORMAL
PCP UR QL SCN>25 NG/ML: NORMAL
PH UR STRIP: 7 [PH]
PLATELET # BLD AUTO: 279 K/UL (ref 135–450)
PMV BLD AUTO: 7.3 FL (ref 5–10.5)
RBC # BLD AUTO: 4.17 M/UL (ref 4–5.2)
WBC # BLD AUTO: 22.5 K/UL (ref 4–11)

## 2024-07-28 PROCEDURE — 36415 COLL VENOUS BLD VENIPUNCTURE: CPT

## 2024-07-28 PROCEDURE — 86901 BLOOD TYPING SEROLOGIC RH(D): CPT

## 2024-07-28 PROCEDURE — 80307 DRUG TEST PRSMV CHEM ANLYZR: CPT

## 2024-07-28 PROCEDURE — 85027 COMPLETE CBC AUTOMATED: CPT

## 2024-07-28 PROCEDURE — 86780 TREPONEMA PALLIDUM: CPT

## 2024-07-28 PROCEDURE — 1220000000 HC SEMI PRIVATE OB R&B

## 2024-07-28 PROCEDURE — 6370000000 HC RX 637 (ALT 250 FOR IP): Performed by: OBSTETRICS & GYNECOLOGY

## 2024-07-28 PROCEDURE — 86900 BLOOD TYPING SEROLOGIC ABO: CPT

## 2024-07-28 PROCEDURE — 7200000001 HC VAGINAL DELIVERY

## 2024-07-28 PROCEDURE — 86850 RBC ANTIBODY SCREEN: CPT

## 2024-07-28 RX ORDER — ONDANSETRON 2 MG/ML
4 INJECTION INTRAMUSCULAR; INTRAVENOUS EVERY 6 HOURS PRN
Status: DISCONTINUED | OUTPATIENT
Start: 2024-07-28 | End: 2024-07-29 | Stop reason: HOSPADM

## 2024-07-28 RX ORDER — SODIUM CHLORIDE 0.9 % (FLUSH) 0.9 %
5-40 SYRINGE (ML) INJECTION PRN
Status: DISCONTINUED | OUTPATIENT
Start: 2024-07-28 | End: 2024-07-29 | Stop reason: HOSPADM

## 2024-07-28 RX ORDER — OXYTOCIN 10 [USP'U]/ML
INJECTION, SOLUTION INTRAMUSCULAR; INTRAVENOUS
Status: DISPENSED
Start: 2024-07-28 | End: 2024-07-28

## 2024-07-28 RX ORDER — TRANEXAMIC ACID 10 MG/ML
1000 INJECTION, SOLUTION INTRAVENOUS
Status: ACTIVE | OUTPATIENT
Start: 2024-07-28 | End: 2024-07-29

## 2024-07-28 RX ORDER — DOCUSATE SODIUM 100 MG/1
100 CAPSULE, LIQUID FILLED ORAL 2 TIMES DAILY
Status: DISCONTINUED | OUTPATIENT
Start: 2024-07-28 | End: 2024-07-29 | Stop reason: HOSPADM

## 2024-07-28 RX ORDER — CARBOPROST TROMETHAMINE 250 UG/ML
250 INJECTION, SOLUTION INTRAMUSCULAR PRN
Status: DISCONTINUED | OUTPATIENT
Start: 2024-07-28 | End: 2024-07-29 | Stop reason: HOSPADM

## 2024-07-28 RX ORDER — SODIUM CHLORIDE 0.9 % (FLUSH) 0.9 %
5-40 SYRINGE (ML) INJECTION EVERY 12 HOURS SCHEDULED
Status: DISCONTINUED | OUTPATIENT
Start: 2024-07-28 | End: 2024-07-29 | Stop reason: HOSPADM

## 2024-07-28 RX ORDER — TERBUTALINE SULFATE 1 MG/ML
0.25 INJECTION, SOLUTION SUBCUTANEOUS
Status: ACTIVE | OUTPATIENT
Start: 2024-07-28 | End: 2024-07-29

## 2024-07-28 RX ORDER — MISOPROSTOL 100 UG/1
400 TABLET ORAL PRN
Status: DISCONTINUED | OUTPATIENT
Start: 2024-07-28 | End: 2024-07-29 | Stop reason: HOSPADM

## 2024-07-28 RX ORDER — SODIUM CHLORIDE 9 MG/ML
INJECTION, SOLUTION INTRAVENOUS PRN
Status: DISCONTINUED | OUTPATIENT
Start: 2024-07-28 | End: 2024-07-29 | Stop reason: HOSPADM

## 2024-07-28 RX ORDER — SODIUM CHLORIDE, SODIUM LACTATE, POTASSIUM CHLORIDE, CALCIUM CHLORIDE 600; 310; 30; 20 MG/100ML; MG/100ML; MG/100ML; MG/100ML
INJECTION, SOLUTION INTRAVENOUS CONTINUOUS
Status: DISCONTINUED | OUTPATIENT
Start: 2024-07-28 | End: 2024-07-29 | Stop reason: HOSPADM

## 2024-07-28 RX ORDER — FERROUS SULFATE 325(65) MG
325 TABLET ORAL EVERY OTHER DAY
Status: DISCONTINUED | OUTPATIENT
Start: 2024-07-28 | End: 2024-07-29 | Stop reason: HOSPADM

## 2024-07-28 RX ORDER — LANOLIN 100 %
OINTMENT (GRAM) TOPICAL PRN
Status: DISCONTINUED | OUTPATIENT
Start: 2024-07-28 | End: 2024-07-29 | Stop reason: HOSPADM

## 2024-07-28 RX ORDER — SODIUM CHLORIDE, SODIUM LACTATE, POTASSIUM CHLORIDE, AND CALCIUM CHLORIDE .6; .31; .03; .02 G/100ML; G/100ML; G/100ML; G/100ML
500 INJECTION, SOLUTION INTRAVENOUS PRN
Status: DISCONTINUED | OUTPATIENT
Start: 2024-07-28 | End: 2024-07-29 | Stop reason: HOSPADM

## 2024-07-28 RX ORDER — SODIUM CHLORIDE 9 MG/ML
25 INJECTION, SOLUTION INTRAVENOUS PRN
Status: DISCONTINUED | OUTPATIENT
Start: 2024-07-28 | End: 2024-07-29 | Stop reason: HOSPADM

## 2024-07-28 RX ORDER — METHYLERGONOVINE MALEATE 0.2 MG/ML
200 INJECTION INTRAVENOUS PRN
Status: DISCONTINUED | OUTPATIENT
Start: 2024-07-28 | End: 2024-07-29 | Stop reason: HOSPADM

## 2024-07-28 RX ORDER — ACETAMINOPHEN 325 MG/1
650 TABLET ORAL EVERY 4 HOURS PRN
Status: DISCONTINUED | OUTPATIENT
Start: 2024-07-28 | End: 2024-07-29 | Stop reason: HOSPADM

## 2024-07-28 RX ORDER — ACETAMINOPHEN 500 MG
1000 TABLET ORAL EVERY 8 HOURS SCHEDULED
Status: DISCONTINUED | OUTPATIENT
Start: 2024-07-28 | End: 2024-07-29 | Stop reason: HOSPADM

## 2024-07-28 RX ORDER — FAMOTIDINE 10 MG/ML
20 INJECTION, SOLUTION INTRAVENOUS 2 TIMES DAILY
Status: DISCONTINUED | OUTPATIENT
Start: 2024-07-28 | End: 2024-07-29 | Stop reason: HOSPADM

## 2024-07-28 RX ORDER — IBUPROFEN 800 MG/1
800 TABLET ORAL EVERY 8 HOURS SCHEDULED
Status: DISCONTINUED | OUTPATIENT
Start: 2024-07-28 | End: 2024-07-29 | Stop reason: HOSPADM

## 2024-07-28 RX ORDER — ONDANSETRON 4 MG/1
4 TABLET, ORALLY DISINTEGRATING ORAL EVERY 6 HOURS PRN
Status: DISCONTINUED | OUTPATIENT
Start: 2024-07-28 | End: 2024-07-29 | Stop reason: HOSPADM

## 2024-07-28 RX ADMIN — IBUPROFEN 800 MG: 800 TABLET, FILM COATED ORAL at 20:10

## 2024-07-28 RX ADMIN — ACETAMINOPHEN 1000 MG: 500 TABLET ORAL at 15:56

## 2024-07-28 RX ADMIN — DOCUSATE SODIUM 100 MG: 100 CAPSULE, LIQUID FILLED ORAL at 20:10

## 2024-07-28 ASSESSMENT — PAIN DESCRIPTION - DESCRIPTORS
DESCRIPTORS: CRAMPING
DESCRIPTORS: DISCOMFORT

## 2024-07-28 ASSESSMENT — PAIN SCALES - GENERAL
PAINLEVEL_OUTOF10: 1
PAINLEVEL_OUTOF10: 1

## 2024-07-28 ASSESSMENT — PAIN DESCRIPTION - LOCATION: LOCATION: PERINEUM

## 2024-07-28 ASSESSMENT — PAIN - FUNCTIONAL ASSESSMENT: PAIN_FUNCTIONAL_ASSESSMENT: ACTIVITIES ARE NOT PREVENTED

## 2024-07-28 NOTE — L&D DELIVERY SUMMARY NOTE
[facility] Obstetrics and Gynecology  Spontaneous Vaginal Delivery Note        Pre-operative Diagnosis:     23 y.o.  at 38w5d EGA    Active Labor / SROM   O+ / RI / GBS neg      Post-operative Diagnosis:   Same  Precipitous   Nuchal cord x 1              Anesthesia:  None      Admission and Delivery:       Patient presented to [facility] Labor and Delivery for painful contractions -- found to be advance dilated with bulging membranes. SROM'd and was complete in triage. She was not able to get any medication for discomfort.     Patient progressed spontaneously to complete cervical dilation.  Began pushing and with good maternal effort. The infant was delivered in the SALVADOR position over intact perineum. Nuchal cord x 1. The shoulders and body were quickly to follow with maternal efforts. Infant was delivered with good tone and spontaneous cry without complication. Mouth and nose were bulb suctioned at maternal abdomen. Delayed cord clamping, partner cut cord after 1 min as cord no longer pulsating. Cord segment and cord blood were obtained.  Cord gasses were collected, however due to fetal wellbeing were discarded.  APGARS were noted to be 9 and 10. Delivery Date and Time: 2024 .      The placenta was delivered spontaneously with manual massage of the uterus and was noted to be intact with a 3 vessel cord. Pt declined IM Pitocin. Bleeding noted to be appropriate.     No lacerations were noted.      The patient tolerated well and is in stable condition following delivery.      EBL: 200 ml     Infant Wt: Pending     APGARS: 9, 10      Specimen:  Placenta / Cord segment      Condition:  infant stable and mother stable      Attending Attestation: I performed the procedure.      Cordelia Rodriguez DO

## 2024-07-28 NOTE — PROGRESS NOTES
Pt assisted by 2 staff members to restroom for first time get up. Pt denies dizziness or lightheadedness. Gait steady. Pt voided 400 mL urine without difficulty. Pericare done by pt with RN instruction. New ice pack, pad and panties put on pt. Gown changed. Hand hygiene done. Complete linen change done and comfort pad put on bed. Pt tolerated well.

## 2024-07-28 NOTE — FLOWSHEET NOTE
PT presented to OB triage unit with complaints of ctx since 2100. SVE complete/+1 on admission. Dr. Rodriguez called to bedside for delivery.

## 2024-07-28 NOTE — H&P
Obstetrics Triage History and Physical    CC:   Chief Complaint   Patient presents with    Contractions       HPI: Diann Roach is a 23 y.o.  at 38w5d who presents with complaints of contractions since 9 pm. Denies LOF or VB. Baby moving well. RN checked patient and I was called to bedside urgently --she is complete with bulging membranes. Pregnancy has thus far been complicated by Pyelonephritis in T1, Short interval pregnancies, abnormal BPD.    Review of Systems: The following ROS was otherwise negative, except as noted in the HPI    OBGYN Provider : MD Thomas    Obstetrical History:  OB History    Para Term  AB Living   2 1 1 0 0 1   SAB IAB Ectopic Molar Multiple Live Births   0 0 0 0 0 1      # Outcome Date GA Lbr González/2nd Weight Sex Delivery Anes PTL Lv   2 Current            1 Term 23 37w0d  3.204 kg (7 lb 1 oz) M Vag-Spont   LORAINE      Complications: Pyelonephritis       Past Medical History:   History reviewed. No pertinent past medical history.    Medications:  No current facility-administered medications on file prior to encounter.     Current Outpatient Medications on File Prior to Encounter   Medication Sig Dispense Refill    ondansetron (ZOFRAN-ODT) 4 MG disintegrating tablet Take 1 tablet by mouth every 8 hours as needed for Nausea 10 tablet 1    Prenatal Vit-DSS-Fe Cbn-FA (PRENATAL AD PO) Take by mouth          Allergies:  Oxycodone, Percocet [oxycodone-acetaminophen], and Sulfa antibiotics    Surgical History:  Past Surgical History:   Procedure Laterality Date    BREAST SURGERY         Family History:  History reviewed. No pertinent family history.    Social History:  Social History     Substance and Sexual Activity   Alcohol Use Never     Social History     Substance and Sexual Activity   Drug Use Never     Social History     Tobacco Use   Smoking Status Never   Smokeless Tobacco Never       Physical Exam:  BP (!) 115/59   Pulse (!) 101   Temp 98.9 °F (37.2 °C) (Oral)

## 2024-07-29 VITALS
DIASTOLIC BLOOD PRESSURE: 58 MMHG | OXYGEN SATURATION: 98 % | RESPIRATION RATE: 16 BRPM | HEART RATE: 87 BPM | TEMPERATURE: 97.6 F | SYSTOLIC BLOOD PRESSURE: 104 MMHG

## 2024-07-29 LAB
HCT VFR BLD AUTO: 34 % (ref 36–48)
HGB BLD-MCNC: 11 G/DL (ref 12–16)
REAGIN+T PALLIDUM IGG+IGM SERPL-IMP: NORMAL

## 2024-07-29 PROCEDURE — 85014 HEMATOCRIT: CPT

## 2024-07-29 PROCEDURE — 36415 COLL VENOUS BLD VENIPUNCTURE: CPT

## 2024-07-29 PROCEDURE — 99024 POSTOP FOLLOW-UP VISIT: CPT | Performed by: OBSTETRICS & GYNECOLOGY

## 2024-07-29 PROCEDURE — 6370000000 HC RX 637 (ALT 250 FOR IP): Performed by: OBSTETRICS & GYNECOLOGY

## 2024-07-29 PROCEDURE — 85018 HEMOGLOBIN: CPT

## 2024-07-29 RX ORDER — IBUPROFEN 800 MG/1
800 TABLET ORAL EVERY 8 HOURS PRN
Qty: 30 TABLET | Refills: 0 | Status: SHIPPED | OUTPATIENT
Start: 2024-07-29

## 2024-07-29 RX ORDER — PSEUDOEPHEDRINE HCL 30 MG
100 TABLET ORAL 2 TIMES DAILY PRN
Qty: 20 CAPSULE | Refills: 0 | Status: SHIPPED | OUTPATIENT
Start: 2024-07-29

## 2024-07-29 RX ADMIN — DOCUSATE SODIUM 100 MG: 100 CAPSULE, LIQUID FILLED ORAL at 09:47

## 2024-07-29 RX ADMIN — ACETAMINOPHEN 1000 MG: 500 TABLET ORAL at 09:46

## 2024-07-29 RX ADMIN — IBUPROFEN 800 MG: 800 TABLET, FILM COATED ORAL at 05:22

## 2024-07-29 RX ADMIN — ACETAMINOPHEN 1000 MG: 500 TABLET ORAL at 01:37

## 2024-07-29 ASSESSMENT — PAIN SCALES - GENERAL
PAINLEVEL_OUTOF10: 3
PAINLEVEL_OUTOF10: 0
PAINLEVEL_OUTOF10: 0

## 2024-07-29 ASSESSMENT — PAIN DESCRIPTION - DESCRIPTORS: DESCRIPTORS: CRAMPING

## 2024-07-29 ASSESSMENT — PAIN DESCRIPTION - LOCATION: LOCATION: ABDOMEN
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
## 2024-07-29 NOTE — LACTATION NOTE
This note was copied from a baby's chart.  LACTATION CONSULTATION  Initial Lactation Consult:   Referred by: Lactation consultant follow up     Name: Naveen Roach       MRN: [id number]         YOB: 2023   Time of Birth: 6:13 AM   Gestational age: Gestational Age: 38w5d   Birth Weight: Birth Weight: 3.395 kg (7 lb 7.8 oz) Most Recent Weight: Weight: 3.395 kg (7 lb 7.8 oz) (Filed from Delivery Summary)   Weight Change from Birth: 0%           Maternal Assessment:  Maternal Data:  Information for the patient's mother:  Diann Roach [[id number]]   22 y.o.   /Para:   Information for the patient's mother:  Diann Roach [[id number]]      Information for the patient's mother:  Diann Roach [[id number]]   38w5d     Prenatal Breastfeeding Education: None    Prior Breastfeeding Experience:  other Children  and  for: 8-9 months, until she became pregnant with this baby.    Breastfeeding Goal: Exclusively Breastfeed     Breast Assessment  Right Breast:  Breast not assessed    Left Breast: WDL  Left Nipple: Everts well   Left Areola: WDL   Left Nipple Comfort: comfortable   Left Nipple Integrity: Intact    Medications of Concern:None    Maternal Toxicology:   Information for the patient's mother:  Diann Roach [[id number]]     Barbiturate Screen, Ur   Date Value Ref Range Status   2024 Neg Negative <200 ng/mL Final     Benzodiazepine Screen, Urine   Date Value Ref Range Status   2024 Neg Negative <200 ng/mL Final     Cannabinoid Scrn, Ur   Date Value Ref Range Status   2024 Neg Negative <50 ng/mL Final     Cocaine Metabolite Screen, Urine   Date Value Ref Range Status   2024 Neg Negative <300 ng/mL Final     Methadone Screen, Urine   Date Value Ref Range Status   2024 Neg Negative <300 ng/mL Final     pH, Urine   Date Value Ref Range Status   2024 7.0  Final     Comment:     Urine pH less than 5.0 or greater than 8.0 may indicate

## 2024-07-29 NOTE — PLAN OF CARE
Problem: Vaginal Birth or  Section  Goal: Fetal and maternal status remain reassuring during the birth process  Description:  Birth OB-Pregnancy care plan goal which identifies if the fetal and maternal status remain reassuring during the birth process  Outcome: Completed     Problem: Postpartum  Goal: Experiences normal postpartum course  Description:  Postpartum OB-Pregnancy care plan goal which identifies if the mother is experiencing a normal postpartum course  2024 1250 by Linda Wick RN  Outcome: Completed  2024 by Yesy Cornejo RN  Outcome: Progressing  Goal: Appropriate maternal -  bonding  Description:  Postpartum OB-Pregnancy care plan goal which identifies if the mother and  are bonding appropriately  2024 1250 by Linda Wick RN  Outcome: Completed  2024 by Yesy Cornejo RN  Outcome: Progressing  Goal: Establishment of infant feeding pattern  Description:  Postpartum OB-Pregnancy care plan goal which identifies if the mother is establishing a feeding pattern with their   2024 1250 by Linda Wick RN  Outcome: Completed  2024 by Yesy Cornejo RN  Outcome: Progressing  Goal: Incisions, wounds, or drain sites healing without S/S of infection  2024 1250 by Linda Wick RN  Outcome: Completed  2024 by Yesy Cornejo RN  Outcome: Progressing  Flowsheets (Taken 2024 1715 by Tea Segal RN)  Incisions, Wounds, or Drain Sites Healing Without Sign and Symptoms of Infection:   ADMISSION and DAILY: Assess and document risk factors for pressure ulcer development   TWICE DAILY: Assess and document skin integrity   TWICE DAILY: Assess and document dressing/incision, wound bed, drain sites and surrounding tissue     Problem: Pain  Goal: Verbalizes/displays adequate comfort level or baseline comfort level  2024 1250 by Linda Wick RN  Outcome:

## 2024-07-29 NOTE — DISCHARGE INSTRUCTIONS
Call for increased pain, significant vaginal bleeding or temperature greater than 101 degrees F.  Follow up 2 weeks.   Thank you for the opportunity to care for you and your family.    We hope that you are happy with the care we provided during your stay in the [facility]. We want to ensure that you have the help you need when you leave the hospital. If there is anything we can assist you with, please let us know.    Breastfeeding mothers may contact our lactation specialists with any problems or questions.  [facility] lactation services phone number is [phone].  Leave a message and your call will be returned.    Please refer to the information provided in the postpartum care booklet.  The following are warning signs to remember.    Call 911 if you have:    Chest pain or pressure  Shortness of breath, even at rest  Thoughts of harming yourself or others  Seizures    Call your healthcare provider if you have:    Temperature of 100.4 degrees or higher  Stitches that are not healing        -- Swelling, bleeding, drainage, foul odor, redness or warmth in/around your           stitches, staples, or incision (scar)        -- Bad smelling blood or discharge from the vagina  Vaginal bleeding that has increased         -- Soaking through one pad in an hour        -- You are passing clots larger than the size of a lemon  Red, warm tender area(s) in your breast or calf  Headache that does not get better, even after taking medicine; or headache with vision changes    Remember to notify all healthcare providers from your date of delivery to up to one year after giving birth!    CARING FOR YOURSELF        DIET/ACTIVITY    Eat a well balanced diet focusing on foods high in fiber and protein.  Drink plenty of fluids, especially water.  To avoid constipation you may take a mild stool softener as recommended by your doctor or midwife.  Gradually increase your activity. Resume an exercise regime only after being

## 2024-07-29 NOTE — DISCHARGE SUMMARY
DISCHARGE SUMMARY      Primary Diagnosis: intrauterine pregnancy at 38 weeks 5 days gestation  Secondary Diagnosis: short interval pregnancy, pyelonephritis in first trimester  Procedures: normal spontaneous vaginal delivery   Referrals: lactation  Significant Findings: viable male   Reason for Hospitalization: active labor  Complications: none        Discharge Instructions:    Diet:common adult  Activity:activity as tolerated, no heavy lifting or driving for 2 weeks, pelvic rest x 6 weeks  Medications: ibuprofen, colace, Tylenol  Disposition: Home  Condition on discharge: Stable  Follow up: in 2 week(s)

## 2024-07-29 NOTE — PROGRESS NOTES
Discharge Phone Call    Patient Name: Diann Roach     OB Care Provider: Cordelia Rodriguez DO Discharge Date: 2024    Disposition of baby:    Phone Number: [phone] (home)     Attempts to Contact:  Date:    Caller  Date:    Caller  Date:    Caller    Information for the patient's :  Naveen Roach [[id number]]   Delivery Method: Vaginal, Spontaneous     1.  Now that you are at home is your pain being well controlled?   Y/N   If no, instruct to call       provider.      2. Are you breastfeeding?    Y/N    Do you need any extra support from our lactation staff?      Y/N    If yes, provide number for lactation.  3. Have you made or already had your first appointment with the baby's doctor? Y/N   If no, do      you know when to schedule it?  Y/N    4. Have you scheduled your follow-up appointment?  Y/N  If no, do you know when to schedule       it?    Y/N   If no, they can find it on printed discharge instructions.  5. Did staff discuss safe sleep during your stay? Y/N   6. Did we explain things in a way you could understand?  Y/N  7. Were we respectful of your preferences for labor and birth and include you in the plan of       care?  Y/N  If no, please explain _______________________________________________  8. Is there anyone in particular you would like to mention who provided care for you? _______      _________________________________________________________________________     9. Were you given a Post-Birth Warning Signs handout?  Y/N  Do you have it somewhere      easily accessible?  Y/N  If no, please send them a copy and ask them to put it somewhere      easily found.  10. Have you been crying excessively, having anger or mood swings that feel out of control, or       feel like you can't cope with caring for yourself or baby? Y/N   If yes, they may be showing       signs of postpartum depression and should call provider. There is also a        depression test on page C5 in their

## 2024-07-29 NOTE — PLAN OF CARE
Problem: Postpartum  Goal: Experiences normal postpartum course  Description:  Postpartum OB-Pregnancy care plan goal which identifies if the mother is experiencing a normal postpartum course  Outcome: Progressing  Goal: Appropriate maternal -  bonding  Description:  Postpartum OB-Pregnancy care plan goal which identifies if the mother and  are bonding appropriately  Outcome: Progressing  Goal: Establishment of infant feeding pattern  Description:  Postpartum OB-Pregnancy care plan goal which identifies if the mother is establishing a feeding pattern with their   Outcome: Progressing  Goal: Incisions, wounds, or drain sites healing without S/S of infection  Outcome: Progressing  Flowsheets (Taken 2024 by Tea Segal RN)  Incisions, Wounds, or Drain Sites Healing Without Sign and Symptoms of Infection:   ADMISSION and DAILY: Assess and document risk factors for pressure ulcer development   TWICE DAILY: Assess and document skin integrity   TWICE DAILY: Assess and document dressing/incision, wound bed, drain sites and surrounding tissue     Problem: Pain  Goal: Verbalizes/displays adequate comfort level or baseline comfort level  Outcome: Progressing  Flowsheets (Taken 2024 by Tea Segal RN)  Verbalizes/displays adequate comfort level or baseline comfort level:   Encourage patient to monitor pain and request assistance   Assess pain using appropriate pain scale   Administer analgesics based on type and severity of pain and evaluate response   Implement non-pharmacological measures as appropriate and evaluate response     Problem: Infection - Adult  Goal: Absence of infection at discharge  Outcome: Progressing  Goal: Absence of infection during hospitalization  Outcome: Progressing  Goal: Absence of fever/infection during anticipated neutropenic period  Outcome: Progressing     Problem: Safety - Adult  Goal: Free from fall injury  Outcome: Progressing     Problem:

## 2024-07-29 NOTE — LACTATION NOTE
This note was copied from a baby's chart.  LACTATION CONSULTATION      Follow-up Consult: Reason for Follow-up: assess needs  and discharge education      MOB reports that breastfeeding is going well. Denies needs or concerns at this consult. Feels ready for discharge home.       Name: Naveen Roach       MRN: [id number]               YOB: 2023   Time of Birth: 6:13 AM   Gestational age: Gestational Age: 38w5d   Birth Weight: Birth Weight: 3.395 kg (7 lb 7.8 oz) Most Recent Weight: Weight: 3.277 kg (7 lb 3.6 oz)   Weight Change from Birth: -3%            Maternal Assessment:      Maternal Data:   Information for the patient's mother:  Diann Roach [[id number]]   22 y.o.    /Para:   Information for the patient's mother:  Diann Roach [[id number]]        Information for the patient's mother:  Diann Roach [[id number]]   38w5d          Breast Assessment  Right Breast: WDL  Right Nipple: Everts well   Right Areola: WDL   Right Nipple Comfort: comfortable   Right Nipple Integrity: Intact    Left Breast: WDL  Left Nipple: Everts well   Left Areola: WDL   Left Nipple Comfort: comfortable   Left Nipple Integrity: Intact     Infant Assessment:      DOL:Infant 27 hours old.      Feeding: Breastfeeding      Nipple Shield in Use:  No  Nipple Shield Size:      I&O adequacy:  Urine output: is established  Stool output: is established  Percent weight change from birthweight: -3%     Oral Assessment: Did not assess at this encounter.     Birth Factors/Diagnosis that could create risk for breastfeeding:   Early Term     Glucose: No    Intervention during consultation:     Interventions Performed:   Education  and Skin to skin     Latch & Positioning: Did not assess at this encounter. MOB states that she is able to position and latch infant independently to breast.    Manual Expression:  MOB states she understands     Bedside Breast Pump:   N/A    Breast Shield Size:   N/A    Amount of milk

## 2024-07-29 NOTE — DISCHARGE INSTR - DIET

## 2024-07-29 NOTE — PROGRESS NOTES
Department of Obstetrics and Gynecology  Labor and Delivery  Attending Post Partum Progress Note      SUBJECTIVE:   22 y/o  female S/P uncomplicated precipitous Vaginal Delivery on 24, PPD #1. The pregnancy was complicated by  nausea/vomiting, pyelonephritis in first trimester, abnormal BPD, and short interval pregnancy . No current complaints are noted including headache, change in vision, fever, chills, chest pain, shortness of breath, nausea, vomiting, diarrhea or constipation. The patient denies any urinary complaints or calf tenderness.  Lochia is described as mild. The patient plans to breastfeed.  OBJECTIVE:      Vitals:  BP (!) 92/51   Pulse 75   Temp 98.3 °F (36.8 °C) (Oral)   Resp 16   LMP  (LMP Unknown) Comment: Haven't had a period since having her 8 month old.  SpO2 98%   Breastfeeding Unknown     CONSTITUTIONAL:  awake, alert, cooperative, no apparent distress, and appears stated age  LUNGS:  No increased work of breathing, good air exchange, clear to auscultation bilaterally, no crackles or wheezing  CARDIOVASCULAR:  normal S1 and S2  ABDOMEN:  soft, non-distended, and non-tender  MUSCULOSKELETAL:  full range of motion noted  NEUROLOGIC:  Mental Status Exam:  Level of Alertness:   awake  Orientation:   person, place, time  Memory:   normal  Fund of Knowledge:  normal  Attention/Concentration:  normal  Language:  normal  SKIN:  normal skin color, texture, turgor    DATA:      ntains abnormal data Hemoglobin and Hematocrit  Order: [id number]  Status: Final result       Visible to patient: Yes (not seen)       Next appt: Today at 10:20 AM in Obstetrics and Gynecology (Rosalia David DO)    0 Result Notes            Component  Ref Range & Units 24 0706 24 0815 24 1645 24 1451 24 1321 23 0923 23 0430   Hemoglobin  12.0 - 16.0 g/dL 11.0 Low  11.0 Low  10.8 Low  NA Panic  VC, CM 11.8 Low  13.0 11.6 Low    Hematocrit  36.0 - 48.0 % 34.0 Low  34.8 Low

## 2024-08-08 ENCOUNTER — POSTPARTUM VISIT (OUTPATIENT)
Dept: OBGYN CLINIC | Age: 24
End: 2024-08-08

## 2024-08-08 VITALS
DIASTOLIC BLOOD PRESSURE: 78 MMHG | WEIGHT: 122 LBS | HEART RATE: 115 BPM | TEMPERATURE: 98.7 F | BODY MASS INDEX: 22.45 KG/M2 | SYSTOLIC BLOOD PRESSURE: 110 MMHG | OXYGEN SATURATION: 97 % | HEIGHT: 62 IN

## 2024-08-08 PROBLEM — O21.9 NAUSEA AND VOMITING IN PREGNANCY: Status: RESOLVED | Noted: 2023-12-21 | Resolved: 2024-08-08

## 2024-08-08 PROBLEM — O09.899 SHORT INTERVAL BETWEEN PREGNANCIES AFFECTING PREGNANCY, ANTEPARTUM: Status: RESOLVED | Noted: 2024-02-12 | Resolved: 2024-08-08

## 2024-08-08 PROBLEM — Z34.93 PRENATAL CARE IN THIRD TRIMESTER: Status: RESOLVED | Noted: 2023-12-21 | Resolved: 2024-08-08

## 2024-08-08 PROBLEM — Z37.9 NORMAL LABOR: Status: RESOLVED | Noted: 2024-07-28 | Resolved: 2024-08-08

## 2024-08-08 PROBLEM — O23.01 PYELONEPHRITIS AFFECTING PREGNANCY IN FIRST TRIMESTER: Status: RESOLVED | Noted: 2023-12-01 | Resolved: 2024-08-08

## 2024-08-08 NOTE — PROGRESS NOTES
Postpartum Visit    Subjective:  23 y.o.  female S/P uncomplicated Vaginal Delivery on 24 here for postpartum visit. The pregnancy was complicated by Pyelonephritis in T1, Short interval pregnancies, abnormal BPD.    Postpartum course has been uncomplicated. Bleeding is lightening up. Bowel function is normal. Bladder function is normal. Patient is not sexually active. Contraception method is abstinence. Baby has been doing well without problems. Baby is feeding breast. No other complaints are noted including headache, change in vision, fever, chills, chest pain, shortness of breath, nausea, vomiting, diarrhea or constipation. The patient denies any urinary complaints or calf tenderness.     Review of Systems - The following ROS was otherwise negative, except as noted in the HPI: constitutional, respiratory, cardiovascular, gastrointestinal, genitourinary    Objective:  GENERAL APPEARANCE: alert, well appearing, in no apparent distress  LUNGS: clear to auscultation, no wheezes, rales or rhonchi, symmetric air entry  HEART: regular rate and rhythm  ABDOMEN POSTPARTUM: benign non-tender, without masses or organomegaly palpable  EXTREMITIES: no redness or tenderness in the calves or thighs, no edema    MWQ: 0, no SI/HI    Impression:  23 y.o.  s/p vaginal delivery on 24 here for her postpartum visit:    Plan:  1.  (normal spontaneous vaginal delivery)  Doing well, routine care  - Feeding: breast, going well  - BCM: nexplanon, paperwork started today  - Moods: no signs/sx PPD, denies SI/HI  - Bleeding: imrpoving    Dispo: PRN or 4 weeks for PPV  Emily Guzman MD

## 2024-08-13 ENCOUNTER — TELEPHONE (OUTPATIENT)
Dept: OBGYN CLINIC | Age: 24
End: 2024-08-13

## 2024-08-26 ENCOUNTER — POSTPARTUM VISIT (OUTPATIENT)
Dept: OBGYN CLINIC | Age: 24
End: 2024-08-26
Payer: COMMERCIAL

## 2024-08-26 VITALS
BODY MASS INDEX: 22.08 KG/M2 | WEIGHT: 120 LBS | SYSTOLIC BLOOD PRESSURE: 110 MMHG | TEMPERATURE: 98.8 F | HEIGHT: 62 IN | DIASTOLIC BLOOD PRESSURE: 72 MMHG | OXYGEN SATURATION: 98 % | HEART RATE: 112 BPM

## 2024-08-26 PROCEDURE — G8428 CUR MEDS NOT DOCUMENT: HCPCS | Performed by: OBSTETRICS & GYNECOLOGY

## 2024-08-26 PROCEDURE — 1111F DSCHRG MED/CURRENT MED MERGE: CPT | Performed by: OBSTETRICS & GYNECOLOGY

## 2024-08-26 PROCEDURE — 99213 OFFICE O/P EST LOW 20 MIN: CPT | Performed by: OBSTETRICS & GYNECOLOGY

## 2024-08-26 PROCEDURE — 1036F TOBACCO NON-USER: CPT | Performed by: OBSTETRICS & GYNECOLOGY

## 2024-08-26 PROCEDURE — G8420 CALC BMI NORM PARAMETERS: HCPCS | Performed by: OBSTETRICS & GYNECOLOGY

## 2024-08-26 NOTE — PROGRESS NOTES
Postpartum Visit    Subjective:  23 y.o.  female S/P uncomplicated Vaginal Delivery on 24 here for postpartum visit. The pregnancy was complicated by  Pyelonephritis in T1, Short interval pregnancies, abnormal BPD .     Postpartum course has been uncomplicated. Bleeding has resolved. Bowel function is normal. Bladder function is normal. Patient is not sexually active. Contraception method is abstinence. Baby has been doing well without problems. Baby is feeding breast. No other complaints are noted including headache, change in vision, fever, chills, chest pain, shortness of breath, nausea, vomiting, diarrhea or constipation. The patient denies any urinary complaints or calf tenderness.     Review of Systems - The following ROS was otherwise negative, except as noted in the HPI: constitutional, respiratory, cardiovascular, gastrointestinal, genitourinary    Objective:  GENERAL APPEARANCE: alert, well appearing, in no apparent distress  LUNGS: clear to auscultation, no wheezes, rales or rhonchi, symmetric air entry  HEART: regular rate and rhythm  ABDOMEN POSTPARTUM: soft, nontender, no rebound/guarding  EXTREMITIES: no redness or tenderness in the calves or thighs, no edema    MWQ: 0    Impression:  23 y.o.  s/p vaginal delivery on 24 here for her postpartum visit:    Plan:  1.  (normal spontaneous vaginal delivery)  Doing well, routine care  - Feeding: breast, going well  - BCM: nexplanon, paperwork completed, device not yet in office  - Moods: no signs/sx PPD, denies SI/HI  - Bleeding: resolved    Dispo: PRN or for nexplanon insertion  Emily Guzman MD

## 2024-09-05 ENCOUNTER — TELEPHONE (OUTPATIENT)
Dept: OBGYN CLINIC | Age: 24
End: 2024-09-05

## 2024-09-23 ENCOUNTER — OFFICE VISIT (OUTPATIENT)
Dept: OBGYN CLINIC | Age: 24
End: 2024-09-23
Payer: COMMERCIAL

## 2024-09-23 VITALS
WEIGHT: 120 LBS | HEART RATE: 98 BPM | DIASTOLIC BLOOD PRESSURE: 72 MMHG | HEIGHT: 62 IN | SYSTOLIC BLOOD PRESSURE: 110 MMHG | TEMPERATURE: 98.7 F | OXYGEN SATURATION: 98 % | BODY MASS INDEX: 22.08 KG/M2

## 2024-09-23 DIAGNOSIS — Z30.017 NEXPLANON INSERTION: Primary | ICD-10-CM

## 2024-09-23 LAB
CONTROL: NORMAL
PREGNANCY TEST URINE, POC: NEGATIVE

## 2024-09-23 PROCEDURE — 11981 INSERTION DRUG DLVR IMPLANT: CPT | Performed by: OBSTETRICS & GYNECOLOGY

## 2024-09-23 PROCEDURE — 81025 URINE PREGNANCY TEST: CPT | Performed by: OBSTETRICS & GYNECOLOGY

## 2024-10-05 ENCOUNTER — HOSPITAL ENCOUNTER (EMERGENCY)
Age: 24
Discharge: HOME | End: 2024-10-05
Payer: COMMERCIAL

## 2024-10-05 VITALS
WEIGHT: 116.2 LBS | RESPIRATION RATE: 16 BRPM | BODY MASS INDEX: 21.38 KG/M2 | HEIGHT: 62 IN | DIASTOLIC BLOOD PRESSURE: 62 MMHG | SYSTOLIC BLOOD PRESSURE: 103 MMHG | OXYGEN SATURATION: 100 % | HEART RATE: 86 BPM | TEMPERATURE: 98.4 F

## 2024-10-05 DIAGNOSIS — N61.0 ACUTE MASTITIS OF RIGHT BREAST: Primary | ICD-10-CM

## 2024-10-05 DIAGNOSIS — R74.8 ELEVATED LIVER ENZYMES: ICD-10-CM

## 2024-10-05 LAB
ALBUMIN SERPL-MCNC: 4.6 G/DL (ref 3.4–5)
ALBUMIN/GLOB SERPL: 1.6 {RATIO} (ref 1.1–2.2)
ALP SERPL-CCNC: 89 U/L (ref 40–129)
ALT SERPL-CCNC: 71 U/L (ref 10–40)
ANION GAP SERPL CALCULATED.3IONS-SCNC: 16 MMOL/L (ref 3–16)
AST SERPL-CCNC: 47 U/L (ref 15–37)
BASOPHILS # BLD: 0 K/UL (ref 0–0.2)
BASOPHILS NFR BLD: 0.4 %
BILIRUB SERPL-MCNC: 0.3 MG/DL (ref 0–1)
BUN SERPL-MCNC: 10 MG/DL (ref 7–20)
CALCIUM SERPL-MCNC: 9.7 MG/DL (ref 8.3–10.6)
CHLORIDE SERPL-SCNC: 103 MMOL/L (ref 99–110)
CO2 SERPL-SCNC: 20 MMOL/L (ref 21–32)
CREAT SERPL-MCNC: 0.6 MG/DL (ref 0.6–1.1)
DEPRECATED RDW RBC AUTO: 17.3 % (ref 12.4–15.4)
EOSINOPHIL # BLD: 0.1 K/UL (ref 0–0.6)
EOSINOPHIL NFR BLD: 1.5 %
GFR SERPLBLD CREATININE-BSD FMLA CKD-EPI: >90 ML/MIN/{1.73_M2}
GLUCOSE SERPL-MCNC: 89 MG/DL (ref 70–99)
HCG SERPL QL: NEGATIVE
HCT VFR BLD AUTO: 36.6 % (ref 36–48)
HGB BLD-MCNC: 12 G/DL (ref 12–16)
LACTATE BLDV-SCNC: 0.8 MMOL/L (ref 0.4–2)
LYMPHOCYTES # BLD: 2.4 K/UL (ref 1–5.1)
LYMPHOCYTES NFR BLD: 29.9 %
MCH RBC QN AUTO: 27.4 PG (ref 26–34)
MCHC RBC AUTO-ENTMCNC: 32.8 G/DL (ref 31–36)
MCV RBC AUTO: 83.6 FL (ref 80–100)
MONOCYTES # BLD: 0.8 K/UL (ref 0–1.3)
MONOCYTES NFR BLD: 9.7 %
NEUTROPHILS # BLD: 4.6 K/UL (ref 1.7–7.7)
NEUTROPHILS NFR BLD: 58.5 %
PLATELET # BLD AUTO: 261 K/UL (ref 135–450)
PMV BLD AUTO: 7.3 FL (ref 5–10.5)
POTASSIUM SERPL-SCNC: 4.1 MMOL/L (ref 3.5–5.1)
PROT SERPL-MCNC: 7.4 G/DL (ref 6.4–8.2)
RBC # BLD AUTO: 4.38 M/UL (ref 4–5.2)
SODIUM SERPL-SCNC: 139 MMOL/L (ref 136–145)
WBC # BLD AUTO: 7.9 K/UL (ref 4–11)

## 2024-10-05 PROCEDURE — 2580000003 HC RX 258

## 2024-10-05 PROCEDURE — 80053 COMPREHEN METABOLIC PANEL: CPT

## 2024-10-05 PROCEDURE — 99284 EMERGENCY DEPT VISIT MOD MDM: CPT

## 2024-10-05 PROCEDURE — 6360000002 HC RX W HCPCS

## 2024-10-05 PROCEDURE — 84703 CHORIONIC GONADOTROPIN ASSAY: CPT

## 2024-10-05 PROCEDURE — 83605 ASSAY OF LACTIC ACID: CPT

## 2024-10-05 PROCEDURE — 6370000000 HC RX 637 (ALT 250 FOR IP)

## 2024-10-05 PROCEDURE — 96374 THER/PROPH/DIAG INJ IV PUSH: CPT

## 2024-10-05 PROCEDURE — 85025 COMPLETE CBC W/AUTO DIFF WBC: CPT

## 2024-10-05 RX ORDER — NAPROXEN 500 MG/1
500 TABLET ORAL 2 TIMES DAILY PRN
Qty: 30 TABLET | Refills: 0 | Status: SHIPPED | OUTPATIENT
Start: 2024-10-05

## 2024-10-05 RX ORDER — ONDANSETRON 2 MG/ML
4 INJECTION INTRAMUSCULAR; INTRAVENOUS ONCE
Status: COMPLETED | OUTPATIENT
Start: 2024-10-05 | End: 2024-10-05

## 2024-10-05 RX ORDER — CLINDAMYCIN HCL 150 MG
300 CAPSULE ORAL ONCE
Status: COMPLETED | OUTPATIENT
Start: 2024-10-05 | End: 2024-10-05

## 2024-10-05 RX ORDER — CLINDAMYCIN HCL 300 MG
300 CAPSULE ORAL 3 TIMES DAILY
Qty: 30 CAPSULE | Refills: 0 | Status: SHIPPED | OUTPATIENT
Start: 2024-10-05 | End: 2024-10-15

## 2024-10-05 RX ORDER — NAPROXEN 250 MG/1
500 TABLET ORAL ONCE
Status: COMPLETED | OUTPATIENT
Start: 2024-10-05 | End: 2024-10-05

## 2024-10-05 RX ORDER — 0.9 % SODIUM CHLORIDE 0.9 %
1000 INTRAVENOUS SOLUTION INTRAVENOUS ONCE
Status: COMPLETED | OUTPATIENT
Start: 2024-10-05 | End: 2024-10-05

## 2024-10-05 RX ADMIN — ONDANSETRON 4 MG: 2 INJECTION INTRAMUSCULAR; INTRAVENOUS at 18:29

## 2024-10-05 RX ADMIN — CLINDAMYCIN HYDROCHLORIDE 300 MG: 150 CAPSULE ORAL at 19:32

## 2024-10-05 RX ADMIN — NAPROXEN 500 MG: 250 TABLET ORAL at 19:32

## 2024-10-05 RX ADMIN — SODIUM CHLORIDE 1000 ML: 9 INJECTION, SOLUTION INTRAVENOUS at 18:29

## 2024-10-05 ASSESSMENT — PAIN SCALES - GENERAL
PAINLEVEL_OUTOF10: 0

## 2024-10-05 ASSESSMENT — PAIN - FUNCTIONAL ASSESSMENT
PAIN_FUNCTIONAL_ASSESSMENT: 0-10
PAIN_FUNCTIONAL_ASSESSMENT: 0-10

## 2024-10-05 ASSESSMENT — LIFESTYLE VARIABLES
HOW MANY STANDARD DRINKS CONTAINING ALCOHOL DO YOU HAVE ON A TYPICAL DAY: PATIENT DOES NOT DRINK
HOW OFTEN DO YOU HAVE A DRINK CONTAINING ALCOHOL: NEVER

## 2024-10-05 NOTE — ED NOTES
Written and verbal discharge provided to patient, patient verbalizes understanding. IV removed, no complications, dressing applied. Patient ambulatory with steady gait, GCS 15, no acute signs or symptoms of distress. Patient discharged at this time.  
facial expression

## 2024-10-05 NOTE — ED PROVIDER NOTES
[facility]  ED  Emergency Department Encounter    Patient Name: Diann Roach  MRN: [id number]  YOB: 2000  Date of Evaluation: 10/5/2024  Provider: No primary care provider on file.  Note Started: 6:56 PM EDT 10/5/24    CHIEF COMPLAINT  Breast Problem (Swelling, bruising, and tenderness to right breast for two days; stopped breastfeeding a month and a half ago ) and Headache (Pain in the back of her head that she can't describe except for \"''like wearing a tight ponytail,\" then reports a cold then hot sensation down the back of her neck; states the sensation has mostly resolved at this point but has a residual headache, but also hasn't eaten all day )    SHARED SERVICE VISIT  HORACIO. I have evaluated this patient.      HISTORY OF PRESENT ILLNESS  History From: Patient.    Limitations to history : None    Social Determinants Significantly Affecting Health : None    Diann Roach is a 23 y.o. female who presents to the ED for evaluation of right breast erythema and pain onset approximately 2 days.  Patient states that she had a baby over the summer and over a month ago stopped breast-feeding.  Patient states that she has had some episodes of swelling and erythema in the breast and the most recent episode started 2 days ago primarily oriented to the medial aspect of the right breast and has been slowly worsening.  Patient states that she has had some associated chills, generalized fatigue, and nausea.  Patient denies vomiting, body aches, cough, shortness of breath, chest pain, abdominal pain, changes in bowels, dysuria, neck pain, back pain, and lightheadedness.    No other complaints, modifying factors or associated symptoms.     Nursing notes reviewed were all reviewed and agreed with or any disagreements were addressed in the HPI.    PMH:  Past Medical History:   Diagnosis Date    Fibroadenoma of breast 2021    Preeclampsia 2023    delivered at 37 weeks    Pyelonephritis     Sexual